# Patient Record
Sex: MALE | Race: ASIAN | NOT HISPANIC OR LATINO | URBAN - METROPOLITAN AREA
[De-identification: names, ages, dates, MRNs, and addresses within clinical notes are randomized per-mention and may not be internally consistent; named-entity substitution may affect disease eponyms.]

---

## 2022-01-02 ENCOUNTER — OFFICE VISIT (OUTPATIENT)
Dept: URGENT CARE | Facility: CLINIC | Age: 32
End: 2022-01-02

## 2022-01-02 DIAGNOSIS — R10.11 RUQ PAIN: ICD-10-CM

## 2022-01-02 PROCEDURE — 99204 OFFICE O/P NEW MOD 45 MIN: CPT | Performed by: NURSE PRACTITIONER

## 2022-01-03 VITALS — WEIGHT: 240 LBS | HEIGHT: 74 IN | BODY MASS INDEX: 30.8 KG/M2

## 2022-01-03 ASSESSMENT — CROHNS DISEASE ACTIVITY INDEX (CDAI): CDAI SCORE: 0

## 2022-01-03 ASSESSMENT — ENCOUNTER SYMPTOMS
FEVER: 0
SHORTNESS OF BREATH: 0
FLATUS: 0
HEADACHES: 0
SORE THROAT: 0
ANOREXIA: 0
BELCHING: 0
DIARRHEA: 0
NAUSEA: 1
ABDOMINAL PAIN: 1
VOMITING: 0
BLOOD IN STOOL: 0
EYE PAIN: 0
MYALGIAS: 0
CHILLS: 0
DIZZINESS: 0
CONSTIPATION: 0

## 2022-01-03 NOTE — PROGRESS NOTES
"Subjective:   Fernie Souza is a 31 y.o. male who presents for Abdominal Pain (epigastric, x1 day nausea)      Abdominal Pain  This is a new problem. The current episode started today. The onset quality is undetermined. The problem occurs constantly. The problem has been unchanged. The pain is located in the epigastric region. The pain is at a severity of 7/10. The pain is moderate. The quality of the pain is burning. The abdominal pain radiates to the back. Associated symptoms include nausea. Pertinent negatives include no anorexia, belching, constipation, diarrhea, fever, flatus, headaches, hematuria, melena, myalgias or vomiting. The pain is aggravated by movement and eating. The pain is relieved by nothing. He has tried antacids for the symptoms. The treatment provided no relief. There is no history of colon cancer, Crohn's disease, GERD, irritable bowel syndrome, pancreatitis or PUD.       Review of Systems   Constitutional: Negative for chills and fever.   HENT: Negative for sore throat.    Eyes: Negative for pain.   Respiratory: Negative for shortness of breath.    Cardiovascular: Negative for chest pain.   Gastrointestinal: Positive for abdominal pain and nausea. Negative for anorexia, blood in stool, constipation, diarrhea, flatus, melena and vomiting.   Genitourinary: Negative for hematuria.   Musculoskeletal: Negative for myalgias.   Skin: Negative for rash.   Neurological: Negative for dizziness and headaches.       Medications:    • This patient does not have an active medication from one of the medication groupers.    Allergies: Patient has no allergy information on record.    Problem List: Fernie Souza does not have a problem list on file.    Surgical History:  No past surgical history on file.    Past Social Hx: Fernie Souza       Past Family Hx:  Fernie Souza family history is not on file.     Problem list, medications, and allergies reviewed by myself today in Epic.     Objective:     Ht 1.88 m (6' 2\")   " Wt 109 kg (240 lb)   BMI 30.81 kg/m²     Physical Exam  Vitals and nursing note reviewed.   Constitutional:       General: He is not in acute distress.     Appearance: He is well-developed.   HENT:      Head: Normocephalic and atraumatic.      Right Ear: Tympanic membrane and external ear normal.      Left Ear: Tympanic membrane and external ear normal.      Nose: Nose normal.      Right Sinus: No maxillary sinus tenderness or frontal sinus tenderness.      Left Sinus: No maxillary sinus tenderness or frontal sinus tenderness.      Mouth/Throat:      Mouth: Mucous membranes are moist.      Pharynx: Uvula midline. No posterior oropharyngeal erythema.      Tonsils: No tonsillar exudate or tonsillar abscesses.   Eyes:      General:         Right eye: No discharge.         Left eye: No discharge.      Conjunctiva/sclera: Conjunctivae normal.   Cardiovascular:      Rate and Rhythm: Normal rate.   Pulmonary:      Effort: Pulmonary effort is normal. No respiratory distress.      Breath sounds: Normal breath sounds.   Abdominal:      General: Bowel sounds are normal. There is no distension.      Tenderness: There is abdominal tenderness in the right upper quadrant and epigastric area. There is no right CVA tenderness, left CVA tenderness or guarding. Positive signs include Valera's sign. Negative signs include McBurney's sign and obturator sign.   Musculoskeletal:         General: Normal range of motion.   Skin:     General: Skin is warm and dry.   Neurological:      General: No focal deficit present.      Mental Status: He is alert and oriented to person, place, and time. Mental status is at baseline.      Gait: Gait (gait at baseline) normal.   Psychiatric:         Judgment: Judgment normal.         Assessment/Plan:     Diagnosis and associated orders:     1. RUQ pain  Hyoscyamine Sulfate (HYOSCYAMINE-LIDOCAINE-MAALOX, GI COCKTAIL,)    US-RUQ    Comp Metabolic Panel    CBC WITH DIFFERENTIAL    LIPASE    AMYLASE     CANCELED: CBC WITH DIFFERENTIAL    CANCELED: Comp Metabolic Panel    CANCELED: AMYLASE    CANCELED: LIPASE      Comments/MDM:     I personally reviewed prior external notes and prior test results pertinent to today's visit.  Patient does have right upper quadrant tenderness will obtain diagnostic ultrasound and labs for further evaluation management differentials include but not limited to cholecystitis, pancreatitis, GERD, PUD.  GI cocktail provided in clinic having some relief in symptoms.  Will follow up results and treat as indicated.  Discussed management options, risks and benefits, and alternatives to treatment plan agreed upon.   Red flags discussed and indications to immediately call 911 or present to the Emergency Department.   Supportive care, differential diagnoses, and indications for immediate follow-up discussed with patient.    • Patient expresses understanding and agrees to plan. Patient denies any other questions or concerns.   •              Please note that this dictation was created using voice recognition software. I have made a reasonable attempt to correct obvious errors, but I expect that there are errors of grammar and possibly content that I did not discover before finalizing the note.    This note was electronically signed by Montrell ODELL

## 2024-08-15 ENCOUNTER — APPOINTMENT (OUTPATIENT)
Dept: RADIOLOGY | Facility: MEDICAL CENTER | Age: 34
End: 2024-08-15
Attending: EMERGENCY MEDICINE
Payer: COMMERCIAL

## 2024-08-15 ENCOUNTER — HOSPITAL ENCOUNTER (INPATIENT)
Facility: MEDICAL CENTER | Age: 34
LOS: 1 days | DRG: 064 | End: 2024-08-16
Attending: EMERGENCY MEDICINE | Admitting: STUDENT IN AN ORGANIZED HEALTH CARE EDUCATION/TRAINING PROGRAM
Payer: COMMERCIAL

## 2024-08-15 ENCOUNTER — HOSPITAL ENCOUNTER (EMERGENCY)
Facility: MEDICAL CENTER | Age: 34
End: 2024-08-15
Attending: EMERGENCY MEDICINE
Payer: COMMERCIAL

## 2024-08-15 VITALS
BODY MASS INDEX: 26.85 KG/M2 | DIASTOLIC BLOOD PRESSURE: 88 MMHG | HEART RATE: 66 BPM | RESPIRATION RATE: 16 BRPM | TEMPERATURE: 97.4 F | HEIGHT: 74 IN | OXYGEN SATURATION: 96 % | SYSTOLIC BLOOD PRESSURE: 183 MMHG | WEIGHT: 209.22 LBS

## 2024-08-15 DIAGNOSIS — R20.2 NUMBNESS AND TINGLING OF RIGHT ARM: ICD-10-CM

## 2024-08-15 DIAGNOSIS — R20.0 NUMBNESS AND TINGLING OF RIGHT ARM: ICD-10-CM

## 2024-08-15 DIAGNOSIS — G12.29 BULBAR WEAKNESS (HCC): ICD-10-CM

## 2024-08-15 DIAGNOSIS — R13.10 DYSPHAGIA, UNSPECIFIED TYPE: ICD-10-CM

## 2024-08-15 DIAGNOSIS — I63.9 CEREBROVASCULAR ACCIDENT (CVA), UNSPECIFIED MECHANISM (HCC): ICD-10-CM

## 2024-08-15 DIAGNOSIS — R13.19 ESOPHAGEAL DYSPHAGIA: ICD-10-CM

## 2024-08-15 LAB
ALBUMIN SERPL BCP-MCNC: 4.3 G/DL (ref 3.2–4.9)
ALBUMIN/GLOB SERPL: 1.5 G/DL
ALP SERPL-CCNC: 40 U/L (ref 30–99)
ALT SERPL-CCNC: 12 U/L (ref 2–50)
AMPHET UR QL SCN: NEGATIVE
ANION GAP SERPL CALC-SCNC: 14 MMOL/L (ref 7–16)
APTT PPP: 28.1 SEC (ref 24.7–36)
AST SERPL-CCNC: 16 U/L (ref 12–45)
BARBITURATES UR QL SCN: NEGATIVE
BASOPHILS # BLD AUTO: 0.5 % (ref 0–1.8)
BASOPHILS # BLD: 0.03 K/UL (ref 0–0.12)
BENZODIAZ UR QL SCN: NEGATIVE
BILIRUB SERPL-MCNC: 0.3 MG/DL (ref 0.1–1.5)
BUN SERPL-MCNC: 13 MG/DL (ref 8–22)
BZE UR QL SCN: NEGATIVE
CALCIUM ALBUM COR SERPL-MCNC: 8.9 MG/DL (ref 8.5–10.5)
CALCIUM SERPL-MCNC: 9.1 MG/DL (ref 8.4–10.2)
CANNABINOIDS UR QL SCN: NEGATIVE
CHLORIDE SERPL-SCNC: 106 MMOL/L (ref 96–112)
CO2 SERPL-SCNC: 20 MMOL/L (ref 20–33)
CREAT SERPL-MCNC: 0.8 MG/DL (ref 0.5–1.4)
EKG IMPRESSION: NORMAL
EOSINOPHIL # BLD AUTO: 0.03 K/UL (ref 0–0.51)
EOSINOPHIL NFR BLD: 0.5 % (ref 0–6.9)
ERYTHROCYTE [DISTWIDTH] IN BLOOD BY AUTOMATED COUNT: 43 FL (ref 35.9–50)
FENTANYL UR QL: NEGATIVE
GFR SERPLBLD CREATININE-BSD FMLA CKD-EPI: 118 ML/MIN/1.73 M 2
GLOBULIN SER CALC-MCNC: 2.8 G/DL (ref 1.9–3.5)
GLUCOSE SERPL-MCNC: 107 MG/DL (ref 65–99)
HCT VFR BLD AUTO: 47.4 % (ref 42–52)
HGB BLD-MCNC: 16.2 G/DL (ref 14–18)
IMM GRANULOCYTES # BLD AUTO: 0.01 K/UL (ref 0–0.11)
IMM GRANULOCYTES NFR BLD AUTO: 0.2 % (ref 0–0.9)
INR PPP: 0.95 (ref 0.87–1.13)
LYMPHOCYTES # BLD AUTO: 0.78 K/UL (ref 1–4.8)
LYMPHOCYTES NFR BLD: 12.6 % (ref 22–41)
MCH RBC QN AUTO: 31.5 PG (ref 27–33)
MCHC RBC AUTO-ENTMCNC: 34.2 G/DL (ref 32.3–36.5)
MCV RBC AUTO: 92.2 FL (ref 81.4–97.8)
METHADONE UR QL SCN: NEGATIVE
MONOCYTES # BLD AUTO: 0.39 K/UL (ref 0–0.85)
MONOCYTES NFR BLD AUTO: 6.3 % (ref 0–13.4)
NEUTROPHILS # BLD AUTO: 4.97 K/UL (ref 1.82–7.42)
NEUTROPHILS NFR BLD: 79.9 % (ref 44–72)
NRBC # BLD AUTO: 0 K/UL
NRBC BLD-RTO: 0 /100 WBC (ref 0–0.2)
OPIATES UR QL SCN: NEGATIVE
OXYCODONE UR QL SCN: NEGATIVE
PCP UR QL SCN: NEGATIVE
PLATELET # BLD AUTO: 218 K/UL (ref 164–446)
PMV BLD AUTO: 10.1 FL (ref 9–12.9)
POTASSIUM SERPL-SCNC: 4 MMOL/L (ref 3.6–5.5)
PROPOXYPH UR QL SCN: NEGATIVE
PROT SERPL-MCNC: 7.1 G/DL (ref 6–8.2)
PROTHROMBIN TIME: 13.2 SEC (ref 12–14.6)
RBC # BLD AUTO: 5.14 M/UL (ref 4.7–6.1)
SODIUM SERPL-SCNC: 140 MMOL/L (ref 135–145)
T4 FREE SERPL-MCNC: 1.39 NG/DL (ref 0.93–1.7)
TROPONIN T SERPL-MCNC: <6 NG/L (ref 6–19)
TSH SERPL DL<=0.005 MIU/L-ACNC: 1.02 UIU/ML (ref 0.38–5.33)
WBC # BLD AUTO: 6.2 K/UL (ref 4.8–10.8)

## 2024-08-15 PROCEDURE — 99285 EMERGENCY DEPT VISIT HI MDM: CPT

## 2024-08-15 PROCEDURE — 80053 COMPREHEN METABOLIC PANEL: CPT

## 2024-08-15 PROCEDURE — 71045 X-RAY EXAM CHEST 1 VIEW: CPT

## 2024-08-15 PROCEDURE — 99223 1ST HOSP IP/OBS HIGH 75: CPT | Mod: GC | Performed by: STUDENT IN AN ORGANIZED HEALTH CARE EDUCATION/TRAINING PROGRAM

## 2024-08-15 PROCEDURE — 83735 ASSAY OF MAGNESIUM: CPT

## 2024-08-15 PROCEDURE — 80307 DRUG TEST PRSMV CHEM ANLYZR: CPT

## 2024-08-15 PROCEDURE — 84443 ASSAY THYROID STIM HORMONE: CPT

## 2024-08-15 PROCEDURE — 84439 ASSAY OF FREE THYROXINE: CPT

## 2024-08-15 PROCEDURE — 84484 ASSAY OF TROPONIN QUANT: CPT

## 2024-08-15 PROCEDURE — 85730 THROMBOPLASTIN TIME PARTIAL: CPT

## 2024-08-15 PROCEDURE — 700105 HCHG RX REV CODE 258: Performed by: EMERGENCY MEDICINE

## 2024-08-15 PROCEDURE — 94760 N-INVAS EAR/PLS OXIMETRY 1: CPT

## 2024-08-15 PROCEDURE — 85025 COMPLETE CBC W/AUTO DIFF WBC: CPT

## 2024-08-15 PROCEDURE — 83036 HEMOGLOBIN GLYCOSYLATED A1C: CPT

## 2024-08-15 PROCEDURE — 99406 BEHAV CHNG SMOKING 3-10 MIN: CPT

## 2024-08-15 PROCEDURE — 770020 HCHG ROOM/CARE - TELE (206)

## 2024-08-15 PROCEDURE — 93005 ELECTROCARDIOGRAM TRACING: CPT | Performed by: EMERGENCY MEDICINE

## 2024-08-15 PROCEDURE — 85025 COMPLETE CBC W/AUTO DIFF WBC: CPT | Mod: 91

## 2024-08-15 PROCEDURE — 700117 HCHG RX CONTRAST REV CODE 255: Performed by: EMERGENCY MEDICINE

## 2024-08-15 PROCEDURE — 80053 COMPREHEN METABOLIC PANEL: CPT | Mod: 91

## 2024-08-15 PROCEDURE — 36415 COLL VENOUS BLD VENIPUNCTURE: CPT

## 2024-08-15 PROCEDURE — 85610 PROTHROMBIN TIME: CPT

## 2024-08-15 PROCEDURE — 70496 CT ANGIOGRAPHY HEAD: CPT

## 2024-08-15 PROCEDURE — 0042T CT-CEREBRAL PERFUSION ANALYSIS: CPT

## 2024-08-15 PROCEDURE — 93005 ELECTROCARDIOGRAM TRACING: CPT

## 2024-08-15 PROCEDURE — 70498 CT ANGIOGRAPHY NECK: CPT

## 2024-08-15 RX ORDER — PHENOL 1.4 %
30 AEROSOL, SPRAY (ML) MUCOUS MEMBRANE
Status: ON HOLD | COMMUNITY
End: 2024-08-16

## 2024-08-15 RX ORDER — PROMETHAZINE HYDROCHLORIDE 25 MG/1
12.5-25 SUPPOSITORY RECTAL EVERY 4 HOURS PRN
Status: DISCONTINUED | OUTPATIENT
Start: 2024-08-15 | End: 2024-08-16

## 2024-08-15 RX ORDER — NICOTINE 21 MG/24HR
21 PATCH, TRANSDERMAL 24 HOURS TRANSDERMAL
Status: DISCONTINUED | OUTPATIENT
Start: 2024-08-16 | End: 2024-08-16 | Stop reason: HOSPADM

## 2024-08-15 RX ORDER — ONDANSETRON 4 MG/1
4 TABLET, ORALLY DISINTEGRATING ORAL EVERY 4 HOURS PRN
Status: DISCONTINUED | OUTPATIENT
Start: 2024-08-15 | End: 2024-08-16 | Stop reason: HOSPADM

## 2024-08-15 RX ORDER — ENOXAPARIN SODIUM 100 MG/ML
40 INJECTION SUBCUTANEOUS DAILY
Status: DISCONTINUED | OUTPATIENT
Start: 2024-08-16 | End: 2024-08-16

## 2024-08-15 RX ORDER — POLYETHYLENE GLYCOL 3350 17 G/17G
1 POWDER, FOR SOLUTION ORAL
Status: DISCONTINUED | OUTPATIENT
Start: 2024-08-15 | End: 2024-08-16 | Stop reason: HOSPADM

## 2024-08-15 RX ORDER — ONDANSETRON 2 MG/ML
4 INJECTION INTRAMUSCULAR; INTRAVENOUS EVERY 4 HOURS PRN
Status: DISCONTINUED | OUTPATIENT
Start: 2024-08-15 | End: 2024-08-16 | Stop reason: HOSPADM

## 2024-08-15 RX ORDER — ACETAMINOPHEN 325 MG/1
650 TABLET ORAL EVERY 6 HOURS PRN
Status: DISCONTINUED | OUTPATIENT
Start: 2024-08-15 | End: 2024-08-16 | Stop reason: HOSPADM

## 2024-08-15 RX ORDER — PROCHLORPERAZINE EDISYLATE 5 MG/ML
5-10 INJECTION INTRAMUSCULAR; INTRAVENOUS EVERY 4 HOURS PRN
Status: DISCONTINUED | OUTPATIENT
Start: 2024-08-15 | End: 2024-08-16 | Stop reason: HOSPADM

## 2024-08-15 RX ORDER — AMOXICILLIN 250 MG
2 CAPSULE ORAL EVERY EVENING
Status: DISCONTINUED | OUTPATIENT
Start: 2024-08-16 | End: 2024-08-16 | Stop reason: HOSPADM

## 2024-08-15 RX ORDER — SODIUM CHLORIDE 9 MG/ML
INJECTION, SOLUTION INTRAVENOUS CONTINUOUS
Status: DISCONTINUED | OUTPATIENT
Start: 2024-08-15 | End: 2024-08-15 | Stop reason: HOSPADM

## 2024-08-15 RX ORDER — PROMETHAZINE HYDROCHLORIDE 25 MG/1
12.5-25 TABLET ORAL EVERY 4 HOURS PRN
Status: DISCONTINUED | OUTPATIENT
Start: 2024-08-15 | End: 2024-08-16

## 2024-08-15 RX ADMIN — IOHEXOL 100 ML: 350 INJECTION, SOLUTION INTRAVENOUS at 20:03

## 2024-08-15 RX ADMIN — SODIUM CHLORIDE: 9 INJECTION, SOLUTION INTRAVENOUS at 20:31

## 2024-08-15 ASSESSMENT — FIBROSIS 4 INDEX: FIB4 SCORE: 0.74

## 2024-08-16 ENCOUNTER — APPOINTMENT (OUTPATIENT)
Dept: RADIOLOGY | Facility: MEDICAL CENTER | Age: 34
DRG: 064 | End: 2024-08-16
Payer: COMMERCIAL

## 2024-08-16 ENCOUNTER — PHARMACY VISIT (OUTPATIENT)
Dept: PHARMACY | Facility: MEDICAL CENTER | Age: 34
End: 2024-08-16
Payer: COMMERCIAL

## 2024-08-16 VITALS
SYSTOLIC BLOOD PRESSURE: 134 MMHG | WEIGHT: 209 LBS | BODY MASS INDEX: 26.82 KG/M2 | TEMPERATURE: 97.5 F | HEIGHT: 74 IN | OXYGEN SATURATION: 96 % | RESPIRATION RATE: 16 BRPM | HEART RATE: 61 BPM | DIASTOLIC BLOOD PRESSURE: 75 MMHG

## 2024-08-16 PROBLEM — F10.90 ALCOHOL USE DISORDER: Status: ACTIVE | Noted: 2024-08-16

## 2024-08-16 PROBLEM — F17.200 TOBACCO USE DISORDER: Status: ACTIVE | Noted: 2024-08-16

## 2024-08-16 LAB
ALBUMIN SERPL BCP-MCNC: 4.1 G/DL (ref 3.2–4.9)
ALBUMIN SERPL BCP-MCNC: 4.3 G/DL (ref 3.2–4.9)
ALBUMIN/GLOB SERPL: 1.7 G/DL
ALBUMIN/GLOB SERPL: 1.8 G/DL
ALP SERPL-CCNC: 38 U/L (ref 30–99)
ALP SERPL-CCNC: 39 U/L (ref 30–99)
ALT SERPL-CCNC: 9 U/L (ref 2–50)
ALT SERPL-CCNC: 9 U/L (ref 2–50)
ANION GAP SERPL CALC-SCNC: 10 MMOL/L (ref 7–16)
ANION GAP SERPL CALC-SCNC: 15 MMOL/L (ref 7–16)
AST SERPL-CCNC: 11 U/L (ref 12–45)
AST SERPL-CCNC: 13 U/L (ref 12–45)
BASOPHILS # BLD AUTO: 0.3 % (ref 0–1.8)
BASOPHILS # BLD: 0.02 K/UL (ref 0–0.12)
BILIRUB SERPL-MCNC: 0.4 MG/DL (ref 0.1–1.5)
BILIRUB SERPL-MCNC: 0.5 MG/DL (ref 0.1–1.5)
BUN SERPL-MCNC: 11 MG/DL (ref 8–22)
BUN SERPL-MCNC: 11 MG/DL (ref 8–22)
CALCIUM ALBUM COR SERPL-MCNC: 8.9 MG/DL (ref 8.5–10.5)
CALCIUM ALBUM COR SERPL-MCNC: 9.1 MG/DL (ref 8.5–10.5)
CALCIUM SERPL-MCNC: 9.1 MG/DL (ref 8.5–10.5)
CALCIUM SERPL-MCNC: 9.2 MG/DL (ref 8.5–10.5)
CHLORIDE SERPL-SCNC: 105 MMOL/L (ref 96–112)
CHLORIDE SERPL-SCNC: 107 MMOL/L (ref 96–112)
CO2 SERPL-SCNC: 19 MMOL/L (ref 20–33)
CO2 SERPL-SCNC: 25 MMOL/L (ref 20–33)
CREAT SERPL-MCNC: 0.71 MG/DL (ref 0.5–1.4)
CREAT SERPL-MCNC: 0.92 MG/DL (ref 0.5–1.4)
EOSINOPHIL # BLD AUTO: 0.06 K/UL (ref 0–0.51)
EOSINOPHIL NFR BLD: 0.9 % (ref 0–6.9)
ERYTHROCYTE [DISTWIDTH] IN BLOOD BY AUTOMATED COUNT: 44.2 FL (ref 35.9–50)
EST. AVERAGE GLUCOSE BLD GHB EST-MCNC: 120 MG/DL
GFR SERPLBLD CREATININE-BSD FMLA CKD-EPI: 111 ML/MIN/1.73 M 2
GFR SERPLBLD CREATININE-BSD FMLA CKD-EPI: 122 ML/MIN/1.73 M 2
GLOBULIN SER CALC-MCNC: 2.3 G/DL (ref 1.9–3.5)
GLOBULIN SER CALC-MCNC: 2.5 G/DL (ref 1.9–3.5)
GLUCOSE SERPL-MCNC: 128 MG/DL (ref 65–99)
GLUCOSE SERPL-MCNC: 94 MG/DL (ref 65–99)
HBA1C MFR BLD: 5.8 % (ref 4–5.6)
HCT VFR BLD AUTO: 46 % (ref 42–52)
HGB BLD-MCNC: 16 G/DL (ref 14–18)
IMM GRANULOCYTES # BLD AUTO: 0.01 K/UL (ref 0–0.11)
IMM GRANULOCYTES NFR BLD AUTO: 0.2 % (ref 0–0.9)
LYMPHOCYTES # BLD AUTO: 1.33 K/UL (ref 1–4.8)
LYMPHOCYTES NFR BLD: 20.6 % (ref 22–41)
MAGNESIUM SERPL-MCNC: 2.1 MG/DL (ref 1.5–2.5)
MAGNESIUM SERPL-MCNC: 2.3 MG/DL (ref 1.5–2.5)
MCH RBC QN AUTO: 31.8 PG (ref 27–33)
MCHC RBC AUTO-ENTMCNC: 34.8 G/DL (ref 32.3–36.5)
MCV RBC AUTO: 91.5 FL (ref 81.4–97.8)
MONOCYTES # BLD AUTO: 0.42 K/UL (ref 0–0.85)
MONOCYTES NFR BLD AUTO: 6.5 % (ref 0–13.4)
NEUTROPHILS # BLD AUTO: 4.62 K/UL (ref 1.82–7.42)
NEUTROPHILS NFR BLD: 71.5 % (ref 44–72)
NRBC # BLD AUTO: 0 K/UL
NRBC BLD-RTO: 0 /100 WBC (ref 0–0.2)
PLATELET # BLD AUTO: 232 K/UL (ref 164–446)
PMV BLD AUTO: 10.1 FL (ref 9–12.9)
POTASSIUM SERPL-SCNC: 3.7 MMOL/L (ref 3.6–5.5)
POTASSIUM SERPL-SCNC: 4.1 MMOL/L (ref 3.6–5.5)
PROT SERPL-MCNC: 6.4 G/DL (ref 6–8.2)
PROT SERPL-MCNC: 6.8 G/DL (ref 6–8.2)
RBC # BLD AUTO: 5.03 M/UL (ref 4.7–6.1)
SODIUM SERPL-SCNC: 139 MMOL/L (ref 135–145)
SODIUM SERPL-SCNC: 142 MMOL/L (ref 135–145)
WBC # BLD AUTO: 6.5 K/UL (ref 4.8–10.8)

## 2024-08-16 PROCEDURE — 70553 MRI BRAIN STEM W/O & W/DYE: CPT

## 2024-08-16 PROCEDURE — 700117 HCHG RX CONTRAST REV CODE 255: Mod: JZ

## 2024-08-16 PROCEDURE — 92610 EVALUATE SWALLOWING FUNCTION: CPT

## 2024-08-16 PROCEDURE — 83735 ASSAY OF MAGNESIUM: CPT

## 2024-08-16 PROCEDURE — 700102 HCHG RX REV CODE 250 W/ 637 OVERRIDE(OP): Performed by: STUDENT IN AN ORGANIZED HEALTH CARE EDUCATION/TRAINING PROGRAM

## 2024-08-16 PROCEDURE — 92611 MOTION FLUOROSCOPY/SWALLOW: CPT

## 2024-08-16 PROCEDURE — 97165 OT EVAL LOW COMPLEX 30 MIN: CPT

## 2024-08-16 PROCEDURE — 99239 HOSP IP/OBS DSCHRG MGMT >30: CPT | Performed by: STUDENT IN AN ORGANIZED HEALTH CARE EDUCATION/TRAINING PROGRAM

## 2024-08-16 PROCEDURE — A9270 NON-COVERED ITEM OR SERVICE: HCPCS | Performed by: STUDENT IN AN ORGANIZED HEALTH CARE EDUCATION/TRAINING PROGRAM

## 2024-08-16 PROCEDURE — 97162 PT EVAL MOD COMPLEX 30 MIN: CPT

## 2024-08-16 PROCEDURE — 36415 COLL VENOUS BLD VENIPUNCTURE: CPT

## 2024-08-16 PROCEDURE — 80053 COMPREHEN METABOLIC PANEL: CPT

## 2024-08-16 PROCEDURE — RXMED WILLOW AMBULATORY MEDICATION CHARGE: Performed by: STUDENT IN AN ORGANIZED HEALTH CARE EDUCATION/TRAINING PROGRAM

## 2024-08-16 PROCEDURE — 99222 1ST HOSP IP/OBS MODERATE 55: CPT

## 2024-08-16 PROCEDURE — A9579 GAD-BASE MR CONTRAST NOS,1ML: HCPCS | Mod: JZ

## 2024-08-16 PROCEDURE — 74230 X-RAY XM SWLNG FUNCJ C+: CPT

## 2024-08-16 RX ORDER — GAUZE BANDAGE 2" X 2"
100 BANDAGE TOPICAL DAILY
Status: DISCONTINUED | OUTPATIENT
Start: 2024-08-16 | End: 2024-08-16

## 2024-08-16 RX ORDER — ASPIRIN 300 MG/1
300 SUPPOSITORY RECTAL DAILY
Status: DISCONTINUED | OUTPATIENT
Start: 2024-08-16 | End: 2024-08-16

## 2024-08-16 RX ORDER — HYDRALAZINE HYDROCHLORIDE 20 MG/ML
10 INJECTION INTRAMUSCULAR; INTRAVENOUS EVERY 6 HOURS PRN
Status: DISCONTINUED | OUTPATIENT
Start: 2024-08-16 | End: 2024-08-16

## 2024-08-16 RX ORDER — ASPIRIN 81 MG/1
81 TABLET, CHEWABLE ORAL DAILY
Status: DISCONTINUED | OUTPATIENT
Start: 2024-08-16 | End: 2024-08-16

## 2024-08-16 RX ORDER — FOLIC ACID 1 MG/1
1 TABLET ORAL DAILY
Status: DISCONTINUED | OUTPATIENT
Start: 2024-08-16 | End: 2024-08-16

## 2024-08-16 RX ADMIN — APIXABAN 5 MG: 5 TABLET, FILM COATED ORAL at 14:53

## 2024-08-16 RX ADMIN — GADOTERIDOL 19 ML: 279.3 INJECTION, SOLUTION INTRAVENOUS at 01:47

## 2024-08-16 SDOH — ECONOMIC STABILITY: TRANSPORTATION INSECURITY
IN THE PAST 12 MONTHS, HAS LACK OF RELIABLE TRANSPORTATION KEPT YOU FROM MEDICAL APPOINTMENTS, MEETINGS, WORK OR FROM GETTING THINGS NEEDED FOR DAILY LIVING?: NO

## 2024-08-16 SDOH — ECONOMIC STABILITY: TRANSPORTATION INSECURITY
IN THE PAST 12 MONTHS, HAS THE LACK OF TRANSPORTATION KEPT YOU FROM MEDICAL APPOINTMENTS OR FROM GETTING MEDICATIONS?: NO

## 2024-08-16 ASSESSMENT — ENCOUNTER SYMPTOMS
FEVER: 0
HEARTBURN: 0
CONSTITUTIONAL NEGATIVE: 1
HEMOPTYSIS: 0
COUGH: 0
CARDIOVASCULAR NEGATIVE: 1
HALLUCINATIONS: 0
CHILLS: 0
NAUSEA: 0
PSYCHIATRIC NEGATIVE: 1
PALPITATIONS: 0
SPUTUM PRODUCTION: 0
ORTHOPNEA: 0
DEPRESSION: 0
ABDOMINAL PAIN: 0
HEADACHES: 0
MUSCULOSKELETAL NEGATIVE: 1
RESPIRATORY NEGATIVE: 1
DIZZINESS: 0
MYALGIAS: 0
SENSORY CHANGE: 1
TINGLING: 0
WEIGHT LOSS: 0
EYES NEGATIVE: 1

## 2024-08-16 ASSESSMENT — LIFESTYLE VARIABLES
DOES PATIENT WANT TO TALK TO SOMEONE ABOUT QUITTING: YES
TOTAL SCORE: 4
CONSUMPTION TOTAL: POSITIVE
DOES PATIENT WANT TO STOP DRINKING: YES
TOTAL SCORE: 4
TOTAL SCORE: 4
ALCOHOL_USE: YES
HAVE PEOPLE ANNOYED YOU BY CRITICIZING YOUR DRINKING: YES
ON A TYPICAL DAY WHEN YOU DRINK ALCOHOL HOW MANY DRINKS DO YOU HAVE: 0
HOW MANY TIMES IN THE PAST YEAR HAVE YOU HAD 5 OR MORE DRINKS IN A DAY: 10
AVERAGE NUMBER OF DAYS PER WEEK YOU HAVE A DRINK CONTAINING ALCOHOL: 0
EVER HAD A DRINK FIRST THING IN THE MORNING TO STEADY YOUR NERVES TO GET RID OF A HANGOVER: YES
EVER FELT BAD OR GUILTY ABOUT YOUR DRINKING: YES
HAVE YOU EVER FELT YOU SHOULD CUT DOWN ON YOUR DRINKING: YES

## 2024-08-16 ASSESSMENT — COGNITIVE AND FUNCTIONAL STATUS - GENERAL
SUGGESTED CMS G CODE MODIFIER MOBILITY: CH
MOBILITY SCORE: 24
DAILY ACTIVITIY SCORE: 24
SUGGESTED CMS G CODE MODIFIER DAILY ACTIVITY: CH

## 2024-08-16 ASSESSMENT — SOCIAL DETERMINANTS OF HEALTH (SDOH)
WITHIN THE PAST 12 MONTHS, YOU WORRIED THAT YOUR FOOD WOULD RUN OUT BEFORE YOU GOT THE MONEY TO BUY MORE: NEVER TRUE
IN THE PAST 12 MONTHS, HAS THE ELECTRIC, GAS, OIL, OR WATER COMPANY THREATENED TO SHUT OFF SERVICE IN YOUR HOME?: NO
WITHIN THE LAST YEAR, HAVE YOU BEEN AFRAID OF YOUR PARTNER OR EX-PARTNER?: NO
WITHIN THE LAST YEAR, HAVE YOU BEEN KICKED, HIT, SLAPPED, OR OTHERWISE PHYSICALLY HURT BY YOUR PARTNER OR EX-PARTNER?: NO
WITHIN THE LAST YEAR, HAVE YOU BEEN HUMILIATED OR EMOTIONALLY ABUSED IN OTHER WAYS BY YOUR PARTNER OR EX-PARTNER?: NO
WITHIN THE PAST 12 MONTHS, THE FOOD YOU BOUGHT JUST DIDN'T LAST AND YOU DIDN'T HAVE MONEY TO GET MORE: NEVER TRUE
WITHIN THE LAST YEAR, HAVE TO BEEN RAPED OR FORCED TO HAVE ANY KIND OF SEXUAL ACTIVITY BY YOUR PARTNER OR EX-PARTNER?: NO

## 2024-08-16 ASSESSMENT — GAIT ASSESSMENTS
GAIT LEVEL OF ASSIST: SUPERVISED
DISTANCE (FEET): 350

## 2024-08-16 ASSESSMENT — PAIN DESCRIPTION - PAIN TYPE
TYPE: ACUTE PAIN
TYPE: ACUTE PAIN

## 2024-08-16 ASSESSMENT — PATIENT HEALTH QUESTIONNAIRE - PHQ9
1. LITTLE INTEREST OR PLEASURE IN DOING THINGS: NOT AT ALL
2. FEELING DOWN, DEPRESSED, IRRITABLE, OR HOPELESS: NOT AT ALL
SUM OF ALL RESPONSES TO PHQ9 QUESTIONS 1 AND 2: 0

## 2024-08-16 ASSESSMENT — ACTIVITIES OF DAILY LIVING (ADL): TOILETING: INDEPENDENT

## 2024-08-16 NOTE — PROGRESS NOTES
4 Eyes Skin Assessment Completed by TARA Gallegos and TARA Contreras.    Head WDL  Ears WDL  Nose WDL  Mouth WDL  Neck WDL  Breast/Chest WDL  Shoulder Blades WDL  Spine WDL  (R) Arm/Elbow/Hand WDL  (L) Arm/Elbow/Hand WDL  Abdomen WDL  Groin WDL  Scrotum/Coccyx/Buttocks Redness and Blanching  (R) Leg WDL  (L) Leg WDL  (R) Heel/Foot/Toe Blanching  (L) Heel/Foot/Toe Blanching          Devices In Places Tele Box      Interventions In Place Pillows    Possible Skin Injury No    Pictures Uploaded Into Epic N/A  Wound Consult Placed N/A  RN Wound Prevention Protocol Ordered No

## 2024-08-16 NOTE — PROGRESS NOTES
Discharge instructions reviewed and signed by patient. No further questions at this time. IV DC'd by RN. Medications delivered to discharge lounge. Patient left with all belongings by car with friend/family member in stable condition.

## 2024-08-16 NOTE — ED TRIAGE NOTES
Chief Complaint   Patient presents with    Sent by MD     Transfer from [facility]. Transferred for further evaluation of RUE tingling and difficulty swallowing.     Aox4, GCS 15, ambulatory on arrival    Pt reports sudden onset of tingling to RUE when he was in the shower. Tinglings accompanied with some lightheadedness and difficulty swallowing salvia that started at approx 1200. Pt speaking in full sentences, VSS on room air.-n/v, -stroke scale    Pt transferred for further evaluation with neuro and MRI.     Hx: no pertinent medical hx    No interventions by EMS pta. Pt received approx 200cc NS from  pta.

## 2024-08-16 NOTE — CARE PLAN
The patient is Stable - Low risk of patient condition declining or worsening    Shift Goals  Clinical Goals: MRI, SLP eval, Monitor neuro status  Patient Goals: Get MRI done tonight  Family Goals: SUAD    Progress made toward(s) clinical / shift goals:  Patient is A&Ox4. Educated patient on POC. NIHSS score of 1. MRI completed. ECHO and SLP eval pending. Patient is Strict NPO. Patient denies pain at this time. Patient states no decreased sensation with touch, only to temperature. Bed is low and locked. Bed alarm on. Call light within reach. Hourly rounding continues.       Problem: Neuro Status  Goal: Neuro status will remain stable or improve  Outcome: Progressing     Problem: Respiratory - Stroke Patient  Goal: Patient will achieve/maintain optimum respiratory rate/effort  Outcome: Progressing     Problem: Self Care  Goal: Patient will have the ability to perform ADLs independently or with assistance (bathe, groom, dress, toilet and feed)  Outcome: Progressing       Patient is not progressing towards the following goals:

## 2024-08-16 NOTE — PROGRESS NOTES
Stroke education discussed with pt, went over BEFAST and s/s stroke. Called and LVM with stroke bridge clinic for appointment. Discussed Eliquis, all questions answered at this time. DCL orders in.

## 2024-08-16 NOTE — ED NOTES
Pt resting in room. Vs stable. Cardiac monitor on NSR noted. Call light in place. Will continue to monitor.

## 2024-08-16 NOTE — THERAPY
Speech Language Pathology   Clinical Swallow Evaluation     Patient Name: Fernie Souza  AGE:  34 y.o., SEX:  male  Medical Record #: [id number]  Date of Service: 8/16/2024      History of Present Illness    35 y/o admitted on 8/15 for RUE tingling and difficulty swallowing.    MRI of brain 8/16:  1.  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 The recent CT angiogram demonstrated occluded left vertebral artery. There is patent right vertebral and   basilar arteries.  2.  There is tiny area of acute infarct in the left inferior cerebellar peduncle.  3.  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    CTA of head 8/15:  1.  Distal left vertebral artery occlusion.    CTA of neck 8/15:  1.  Area of nonopacification of the distal left vertebral artery, compatible with occlusion.  2.  Severely hypoplastic left vertebral artery.  3.  Mediastinal and bilateral hilar adenopathy, workup and evaluation for causes of adenopathy recommended as clinically appropriate.    Dx chest 8/15:  No acute cardiopulmonary abnormality.      General Information:  Vitals  O2 (LPM): 0  O2 Delivery Device: None - Room Air  Level of Consciousness: Alert  Patient Behaviors: Anxious  Follows Directives: Yes      Prior Living Situation & Level of Function:  Housing / Facility: 3 Story Apartment / Condo  Lives with - Patient's Self Care Capacity: Alone and Able to Care For Self  Communication: WFL  Swallowing: WFL       Oral Mechanism Evaluation:  Dentition: Good   Facial Symmetry: Equal  Facial Sensation: Equal (equal feeling but impaired temperature perception)     Labial Observations: WFL   Lingual Observations: Midline  Motor Speech: WFL            Laryngeal Function:  Secretion Management: Expectoration of secretions  Voice Quality: WFL          Subjective  Pt endorsed increased difficulty  "swallowing over last 24 hours including management of secretions. He said he can swallow if he lifts his chin up otherwise it feels more effortful than usual. Pt endorsed changes in temperature sensation in cheek, hand, and quad but no change in tongue. He endorsed increased social stress over last few days/weeks, but denied depression/harmful thoughts to self.      Assessment  Current Method of Nutrition: NPO until cleared by speech pathology  Positioning: Nelson's (60-90 degrees)  Bolus Administration: Patient  O2 (LPM): 0 O2 Delivery Device: None - Room Air  Factor(s) Affecting Performance: None        Swallowing Trials:  Swallowing Trials  Thin Liquid (TN0): Impaired  Pureed (PU4): Impaired      Comments: No overt s/sx of aspiration noted with trials of thin liquids and small bite of puree. Pt c/o globus sensation with pudding and completed multiple effortful swallows. For cup sips of thin liquids, pt endorsed need to lift head up in order to swallow successful and upon palpation, noted to trigger 2 swallows. Given report of swallowing not at baseline, further PO trials discontinued.       Clinical Impressions  Pt is presenting with concerns for an acute change in swallow function s/p CVA. Recommend NPO pending MBSS to further assess swallow function. Will tentatively plan for 1100. Okay for small amount of ice chips to minimize xerostomia and maintain integrity of the swallow.       Recommendations  Diet Consistency: NPO pending MBSS  Instrumentation: VFSS (MBSS)  Medication: Non Oral  Oral Care: BID         SLP Treatment Plan  Treatment Plan: Dysphagia Treatment  SLP Frequency: 4x Per Week  Estimated Duration: Until Therapy Goals Met      Anticipated Discharge Needs  Discharge Recommendations: Other (depends on results of MBSS)            Patient / Family Goals  Patient / Family Goal #1: \"to figure out what's going on\"  Short Term Goals  Short Term Goal # 1: Pt will participate in an MBSS to further assess " swallow function and determine safest PO diet      Zaira Lezama, SLP

## 2024-08-16 NOTE — ED TRIAGE NOTES
"Chief Complaint   Patient presents with    Numbness     Started at approx 1200 today, reports noting altered sensation in R side of body- hand, arm, leg. Reports \"the temperature feels different\", but states sensation is otherwise the same. No unilateral deficit is appreciated. GCS is 15. Denies h/a, vision changes, unilateral weakness.     Dizziness     Since today. Intermittent.    Sore Throat     Reports feeling that throat was swollen just PTA. Speaks in complete sentences, resp e/u. Denies noted rash, cold sx.     Physical Exam  Pulmonary:      Effort: Pulmonary effort is normal.   Skin:     General: Skin is warm and dry.   Neurological:      Mental Status: He is alert.           "

## 2024-08-16 NOTE — DISCHARGE PLANNING
[facility] Transitional Care Coordination     Referral from: Dr. Reyes Yparraguirre  Insurance Provider on Facesheet: [employer]  Potential Rehab Diagnosis: TBD     Chart review indicates patient may have on going medical management and may have therapy needs to possibly meet inpatient rehab facility criteria with the goal of returning to community.     D/C support: friend     Physiatry consultation pended per protocol.   W/U and TX pending  Would appreciate therapy evaluations once clinically appropriate.     Thank you for the referral.

## 2024-08-16 NOTE — PROGRESS NOTES
Monitor summary: SB 50-52, IL -0.15, QRS -0.08, QT -0.20, per strip from the monitor room.

## 2024-08-16 NOTE — H&P
[facility] Internal Medicine History & Physical Note    Date of Service  8/16/2024    [facility] Team: LEILA   Attending: Rachana Zhao M.d.  Senior Resident: Armando R. Reyes Yparraguirre, M.D.  Contact Number: [phone]    Primary Care Physician  Pcp Pt States None    Consultants  Neurology: Dr. Hoyos    Code Status  Full Code    Chief Complaint  Chief Complaint   Patient presents with    Sent by MD     Transfer from [facility]. Transferred for further evaluation of RUE tingling and difficulty swallowing.        History of Presenting Illness (HPI): Fernie Souza is a 34 y.o. male transferred from [facility] for neurologic evaluation, who originally presented 8/15/2024 with dysphagia and paresthesia in right upper limb. No chronic conditions, does not take any medication.    Morning of admission, noticed difficulty passing food while attempting to have breakfast.  He was able to drink juice.  Also, noticed difficulty to perceive cold and right upper limb, no change in strength, no other signs of focalization.  No headaches, no palpitations, no shortness of breath, no chest discomfort.  As the day progressed, dysphagia worsened.  Unable to eat lunch.  Paresthesias remained the same.  Went to [facility] for evaluation.  No significant change in symptoms.     In this facility:  - Mildly hypertensive, rest of vitals within reference levels  - On arrival, dysphagia and improving paresthesias in right upper limb  - Unimpressive CBC/CMP  - Negative UDS, negative fentanyl  - Negative TSH/T4, troponin and coagulation panel  - CT cerebral perfusion shows cerebral blood flow less than 30% possibly representing completed infarct   - CTA head shows distal left vertebral artery occlusion  - CTA neck shows area of nonopacification of distal left vertebral artery, compatible with occlusion  - Benign chest x-ray  - Benign EKG    Transfer was initiated for neurologic evaluation.    I discussed the plan of care with patient and  bedside RN.    Review of Systems  Review of Systems   Constitutional: Negative.  Negative for chills, fever and weight loss.   HENT: Negative.     Eyes: Negative.    Respiratory: Negative.  Negative for cough, hemoptysis and sputum production.    Cardiovascular: Negative.  Negative for chest pain, palpitations and orthopnea.   Gastrointestinal:  Negative for abdominal pain, heartburn and nausea.        Inability to pass food   Genitourinary: Negative.  Negative for dysuria.   Musculoskeletal: Negative.  Negative for myalgias.   Skin: Negative.  Negative for rash.   Neurological:  Positive for sensory change. Negative for dizziness, tingling and headaches.   Endo/Heme/Allergies: Negative.    Psychiatric/Behavioral: Negative.  Negative for depression, hallucinations and suicidal ideas.    All other systems reviewed and are negative.    Past Medical History   has a past medical history of Patient denies medical problems.    Surgical History   has no past surgical history on file.     Family History  Mom with diabetes   Family history reviewed with patient.     Social History  Tobacco: Smoked 1 pack/day for 15 years, quit 4 years ago.  Turned to vaping.  For the past month, using nicotine gum.  Stop smoking and vaping.  Still has cravings.  Alcohol: Drinks 4-5 shots of which ever alcohol he can find.  Decreased frequency in the past month, approximately 3 times per week.  Recreational drugs (illegal or prescription): Denies  Employment:   Living Situation: Lives by himself in an apartment in third floor  Recent Travel: Denies  Primary Care Provider: Reviewed  Other (stressors, spirituality, exposures): Denies    Allergies  No Known Allergies    Medications  None       Physical Exam  Temp:  [36.2 °C (97.1 °F)-36.4 °C (97.6 °F)] 36.2 °C (97.1 °F)  Pulse:  [59-79] 59  Resp:  [16-18] 16  BP: (141-186)/(4-97) 159/4  SpO2:  [94 %-98 %] 97 %  Blood Pressure: (!) 141/78   Temperature: 36.4 °C (97.6 °F)   Pulse:  69   Respiration: 16   Pulse Oximetry: 98 %       Physical Exam  Vitals and nursing note reviewed.   Constitutional:       General: He is not in acute distress.     Appearance: Normal appearance. He is not ill-appearing, toxic-appearing or diaphoretic.   Cardiovascular:      Rate and Rhythm: Normal rate and regular rhythm.      Pulses: Normal pulses.      Heart sounds: Normal heart sounds. No murmur heard.  Pulmonary:      Effort: Pulmonary effort is normal. No respiratory distress.      Breath sounds: Normal breath sounds. No stridor. No wheezing or rales.   Abdominal:      General: Bowel sounds are normal. There is no distension.      Palpations: Abdomen is soft.      Tenderness: There is no abdominal tenderness. There is no guarding.   Musculoskeletal:         General: No swelling or tenderness. Normal range of motion.      Right lower leg: No edema.      Left lower leg: No edema.   Skin:     General: Skin is warm.      Capillary Refill: Capillary refill takes less than 2 seconds.      Coloration: Skin is not jaundiced or pale.      Findings: No bruising.   Neurological:      Mental Status: He is alert.      Cranial Nerves: No cranial nerve deficit.      Sensory: Sensory deficit present.      Motor: No weakness.      Coordination: Coordination normal.      Gait: Gait normal.      Deep Tendon Reflexes: Reflexes normal.   Psychiatric:         Mood and Affect: Mood normal.         Behavior: Behavior normal.         Thought Content: Thought content normal.         Judgment: Judgment normal.         Laboratory:  Recent Labs     08/15/24  1915 08/15/24  2206   WBC 6.2 6.5   RBC 5.14 5.03   HEMOGLOBIN 16.2 16.0   HEMATOCRIT 47.4 46.0   MCV 92.2 91.5   MCH 31.5 31.8   MCHC 34.2 34.8   RDW 43.0 44.2   PLATELETCT 218 232   MPV 10.1 10.1     Recent Labs     08/15/24  1915   SODIUM 140   POTASSIUM 4.0   CHLORIDE 106   CO2 20   GLUCOSE 107*   BUN 13   CREATININE 0.80   CALCIUM 9.1     Recent Labs     08/15/24  1915   ALTSGPT  "12   ASTSGOT 16   ALKPHOSPHAT 40   TBILIRUBIN 0.3   GLUCOSE 107*     Recent Labs     08/15/24  1915   APTT 28.1   INR 0.95     No results for input(s): \"NTPROBNP\" in the last 72 hours.      Recent Labs     08/15/24  1915   TROPONINT <6       Imaging:  MR-BRAIN-WITH & W/O    (Results Pending)   EC-ECHOCARDIOGRAM COMPLETE W/O CONT    (Results Pending)       no X-Ray or EKG requiring interpretation    Assessment/Plan: 34-year-old male admitted with dysphagia and paresthesia in right upper limb.  Negative CTA head/neck and perfusion, apparently incidental hypoplastic left vertebral artery.  Evaluated by neuro recommending admission for brain MRI.  * Bulbar weakness (HCC)- (present on admission)  Assessment & Plan  NIHSS 1.  Presenting with 12 hours history of dysphagia and right upper limb paresthesia.  No other signs of focalization.  Negative head/neck CTA and perfusion, but hypoplastic artery.  Evaluated by neurology, recommending admission for brain MRI.  Specialist does not believe that hypoplastic artery is cause.  Broad differential including MS, myasthenia, GBS and stroke.    Unlikely myasthenia due to acute onset.  No prior respiratory/GI symptoms, unlikely GBS and Laura Bahena. Symptoms persist.  Low suspicion for heart embolism, no arrhythmia.  Consider echo if positive MRI.  Stat brain MRI  Telemetry  Neurochecks every 4 hours  A1c and lipid profile  Aspiration precautions  N.p.o. pending bedside swallow and speech evaluation  Checking antibodies for myasthenia  Neuro following     Alcohol use disorder  Assessment & Plan  Patient drinks 4-5 shots daily, recently cut back frequency.  Any drink as long as 40% alcohol.  No family history of substance problems.  No history of cirrhosis but no prior workup.  Lab work okay so far.  I discussed cessation with patient including resources and starting on naltrexone.   Alcohol cessation discussed with patient for 4 minutes.    Tobacco use disorder  Assessment & " Plan  Patient used to smoke 1 pack a day for at least 4 years, then vaped.  For the past month, nicotine gum.  Quit smoking and vaping.    I discussed cessation with patient including continuing nicotine gum.  I also discussed medications to help with cessation with patient including Wellbutrin and Chantix.  Smoking cessation discussed with patient for 4 minutes.        VTE prophylaxis: SCDs/TEDs and enoxaparin ppx    I anticipate this patient will require at least two midnights for appropriate medical management, necessitating inpatient admission because rule out stroke    Patient will need a Telemetry bed on MEDICAL service .  The need is secondary to rule out stroke.

## 2024-08-16 NOTE — ASSESSMENT & PLAN NOTE
NIHSS 1.  Presenting with 12 hours history of dysphagia and right upper limb paresthesia.  No other signs of focalization.  Negative head/neck CTA and perfusion, but hypoplastic artery.  Evaluated by neurology, recommending admission for brain MRI.  Specialist does not believe that hypoplastic artery is cause.  Broad differential including MS, myasthenia, GBS and stroke.    Unlikely myasthenia due to acute onset.  No prior respiratory/GI symptoms, unlikely GBS and Laura Bahena. Symptoms persist.  Low suspicion for heart embolism, no arrhythmia.  Consider echo if positive MRI.  Stat brain MRI  Telemetry  Neurochecks every 4 hours  A1c and lipid profile  Aspiration precautions  N.p.o. pending bedside swallow and speech evaluation  Checking antibodies for myasthenia  Neuro following

## 2024-08-16 NOTE — CONSULTS
"Vascular Neurology Initial Consult H&P  Neurovascular Service, [facility]    Referring Physician: Jim Hoyos*    Chief Complaint   Patient presents with    Sent by MD     Transfer from [facility]. Transferred for further evaluation of RUE tingling and difficulty swallowing.        HPI: Fernie Souza is a 34 y.o. male  with no known prior medical history presenting with dysphagia and right upper arm paresthesia. On 8/15 around noon he noticed a feeling of a \"lump\" in his throat and had to try a few times to swallow but no choking. Later in the night when drinking a shake he could not swallow and had choking. Later when getting in shower he noted he could not feel the water temperature on his right arm and had tingling. 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 Transferred to [facility] where he had MRI brain that revealed a tiny acute infarct in left inferior cerebellar peduncle and left vertebral artery occlusion suspicious for dissection. Symptoms have gradually improved since admission and now he does not feel any difficulty swallowing, still has decreased temperature sensation in left arm.     Works as a . No family or personal history of stroke. No known recent neck manipulation or trauma. Has quit smoking 1 month ago, on nicotine gum. Drinks 4-5 shots of alcohol 3-4 times per week.     Review of systems: In addition to what is detailed in the HPI above, all other systems reviewed and are negative.    Past Medical History:    has a past medical history of Patient denies medical problems.    FHx:  family history is not on file.    SHx:   reports that he has quit smoking. His smoking use included cigarettes. He has never used smokeless tobacco. He reports current alcohol use. He reports that he does not currently use drugs.    Allergies:  No Known Allergies    Medications:    Current " "Facility-Administered Medications:     aspirin (Asa) chewable tab 81 mg, 81 mg, Oral, DAILY **OR** aspirin (Asa) suppository 300 mg, 300 mg, Rectal, DAILY, Armando R Reyes Yparraguirre, M.D.    hydrALAZINE (Apresoline) injection 10 mg, 10 mg, Intravenous, Q6HRS PRN, Armando R Reyes Yparraguirre, M.D.    thiamine (Vitamin B-1) tablet 100 mg, 100 mg, Oral, DAILY, Rachana Zhao M.D.    folic acid (Folvite) tablet 1 mg, 1 mg, Oral, DAILY, Rachana Zhao M.D.    multivitamin tablet 1 Tablet, 1 Tablet, Oral, DAILY, Rachana Zhao M.D.    enoxaparin (Lovenox) inj 40 mg, 40 mg, Subcutaneous, DAILY AT 1800, Armando R Reyes Yparraguirre, M.D.    acetaminophen (Tylenol) tablet 650 mg, 650 mg, Oral, Q6HRS PRN, Armando R Reyes Yparraguirre, M.D.    senna-docusate (Pericolace Or Senokot S) 8.6-50 MG per tablet 2 Tablet, 2 Tablet, Oral, Q EVENING **AND** polyethylene glycol/lytes (Miralax) Packet 1 Packet, 1 Packet, Oral, QDAY PRN, Armando R Reyes Yparraguirre, M.D.    ondansetron (Zofran) syringe/vial injection 4 mg, 4 mg, Intravenous, Q4HRS PRN, Armando R Reyes Yparraguirre, M.D.    ondansetron (Zofran ODT) dispertab 4 mg, 4 mg, Oral, Q4HRS PRN, Armando R Reyes Yparraguirre, M.D.    promethazine (Phenergan) tablet 12.5-25 mg, 12.5-25 mg, Oral, Q4HRS PRN, Armando R Reyes Yparraguirre, M.D.    promethazine (Phenergan) suppository 12.5-25 mg, 12.5-25 mg, Rectal, Q4HRS PRN, Armando R Reyes Yparraguirre, M.D.    prochlorperazine (Compazine) injection 5-10 mg, 5-10 mg, Intravenous, Q4HRS PRN, Armando R Reyes Yparraguirre, M.D.    nicotine (Nicoderm) 21 MG/24HR 21 mg, 21 mg, Transdermal, Daily-0600 **AND** Nicotine Replacement Patient Education Materials, , , Once **AND** nicotine polacrilex (Nicorette) 2 MG piece 2 mg, 2 mg, Oral, Q HOUR PRN, Armando R Reyes Yparraguirre, M.D.      Physical Examination:    /78   Pulse 65   Temp 36.3 °C (97.4 °F) (Temporal)   Resp 16   Ht 1.88 m (6' 2\")   Wt 94.8 kg (209 lb)   SpO2 96%  "  BMI 26.83 kg/m²   General: Patient is awake and in no acute distress  Eye: Examination of optic disks not indicated at this time given acuity of consult  Neck: There is normal range of motion  CV: regular rate   Extremities:  clear, dry, intact, without peripheral edema      NEUROLOGICAL EXAM:   Mental status: Awake, alert and fully oriented  Speech and language: Naming and repetition intact, fluent speech, follows simple, two-step, multi-step and complex commands.  CRANIAL NERVES:  II: Pupils equal and reactive, no VF deficits  III, IV, VI: EOM intact, no gaze preference or deviation, no nystagmus.  V: normal sensation in V1, V2, and V3 segments bilaterally  VII: no asymmetry, no nasolabial fold flattening  VIII: normal hearing to conversation  IX, X: normal palatal elevation, no uvular deviation  XI: 5/5 head turn and 5/5 shoulder shrug bilaterally  XII: midline tongue protrusion  Motor exam: There is sustained antigravity with no downward drift in bilateral arms and legs.  There is no pronator drift. Tone is normal. No abnormal movements were seen on exam.  Sensory exam: Reacts to tactile in all 4 extremities, no neglect to double stim. Sensation intact to vibration, proprioception, fine touch. Impaired to cold in right arm, intact to face and right leg.   Deep tendon reflexes:  2+ throughout. Toes down-going bilaterally.  Coordination: No ataxia on bilateral finger-to-nose testing  Gait: Mild left lateral pulsion during ambulation. No ataxia or weakness noted.       NIHSS: National Institutes of Health Stroke Scale    [0] 1a:Level of Consciousness    0-alert 1-drowsy   2-stupor   3-coma  [0] 1b:LOC Questions                  0-both  1-one      2-neither  [0] 1c:LOC Commands                   0-both  1-one      2-neither  [0] 2: Best Gaze                     0-nl    1-partial  2-forced  [0] 3: Visual Fields                   0-nl    1-partial  2-complete 3-bilat  [0] 4: Facial Paresis                0-nl     "1-minor    2-partial  3-full  MOTOR                       0-nl  [0] 5: Right Arm           1-drift  [0] 6: Left Arm             2-some effort vs gravity  [0] 7: Right Leg           3-no effort vs gravity  [0] 8: Left Leg             4-no movement                             x-untestable  [0] 9: Limb Ataxia                    0-abs   1-1_limb   2-2+_limbs       x-untestable  [1] 10:Sensory                        0-nl    1-partial  2-dense  [0] 11:Best Language/Aphasia         0-nl    1-mild/mod 2-severe   3-mute  [0] 12:Dysarthria                     0-nl    1-mild/mod 2-severe       x-untestable  [0] 13:Neglect/Inattention            0-none  1-partial  2-complete  [1] TOTAL    Baseline Modified [place] Scale (MRS): 0 = No symptoms      Objective Data:    Labs:  Estimated Creatinine Clearance: 130.3 mL/min (by C-G formula based on SCr of 0.92 mg/dL).  Recent Labs     08/15/24  1915 08/15/24  2206 08/16/24  0342   SODIUM 140 139 142   POTASSIUM 4.0 3.7 4.1   CHLORIDE 106 105 107   CO2 20 19* 25   GLUCOSE 107* 94 128*   BUN 13 11 11   CREATININE 0.80 0.71 0.92     Recent Labs     08/15/24  1915 08/15/24  2206 08/16/24  0342   GLUCOSE 107* 94 128*     Recent Labs     08/15/24  1915 08/15/24  2206 08/16/24  0342   ASTSGOT 16 13 11*   ALTSGPT 12 9 9   ALKPHOSPHAT 40 39 38     Recent Labs     08/15/24  1915 08/15/24  2206   WBC 6.2 6.5   HEMOGLOBIN 16.2 16.0   PLATELETCT 218 232     Lab Results   Component Value Date/Time    PROTHROMBTM 13.2 08/15/2024 07:15 PM    INR 0.95 08/15/2024 07:15 PM      Lab Results   Component Value Date/Time    TROPONINT <6 08/15/2024 07:15 PM     Lab Results   Component Value Date/Time    HBA1C 5.8 (H) 08/15/2024 10:06 PM     No results found for: \"LDL\", \"HDL\", \"TRIGLYCERIDE\", \"CHOLSTRLTOT\"    Imaging/Testing:    I interpreted and/or reviewed the patient's neuroimaging    MR-BRAIN-WITH & W/O   Final Result      1.  Nonvisualization of the flow void of intracranial left vertebral artery " consistent left vertebral occlusion likely secondary to dissection. The recent CT angiogram demonstrated occluded left vertebral artery. There is patent right vertebral and    basilar arteries.   2.  There is tiny area of acute infarct in the left inferior cerebellar peduncle.   3.  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      EC-ECHOCARDIOGRAM COMPLETE W/O CONT    (Results Pending)   DX-ESOPHAGUS - [id number]    (Results Pending)       Assessment and Plan:  Fernie Souza is a 34 y.o. male presenting for dysphagia and right arm paresthesia whom neurology has been consulted for acute infarct on MRI. Tiny acute infarct in left inferior cerebellar peduncle. Has distal left vertebral occlusion, suspect dissection based on MRI but unable to clearly visualize on CTA. No known neck manipulation / trauma. Overall he is feeling better with improving dysphagia. Right arm still has impaired temperature sensation and has some mild left lateral pulsion on ambulation. Recommend 6 months anticoagulation with Eliquis for suspected dissection. Can follow up with vascular neurology in stroke bridge clinic.     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 Normotension per primary team, -140  - Initiate Eliquis 5mg BID, will need to continue for 6 months (through February 11th). No need for loading dose.   - Does not need cardioembolic workup with TTE / Zio. Suspected etiology is vertebral dissection  - Target normoglycemia  while admitted  - PT/OT/SLP. MBSS pending.   - DVT prophylaxis: SCDs, Eliquis  - Recommend alcohol cessation, encouraged continued abstinence from smoking.  - No further inpatient testing or interventions required from our standpoint, will sign  off and follow up with him in stroke bridge clinic.       NATI Floyd  Vascular Neurology, Acute Care Services     The evaluation of the patient, and recommended management, was discussed with Dr. Hoyos, attending Vascular Neurologist.

## 2024-08-16 NOTE — ASSESSMENT & PLAN NOTE
Patient drinks 4-5 shots daily, recently cut back frequency.  Any drink as long as 40% alcohol.  No family history of substance problems.  No history of cirrhosis but no prior workup.  Lab work okay so far.  I discussed cessation with patient including resources and starting on naltrexone.   Alcohol cessation discussed with patient for 4 minutes.

## 2024-08-16 NOTE — PROGRESS NOTES
Brief neurology note      34-year-old male presents with right sided sensation changes with hot and cold sensations.  Some numbness.  Also difficulty swallowing.  Sounds like some bulbar weakness.  ERP performed a CTA head and neck which shows a hypoplastic left vertebral artery that occludes intracranially.  I believe this is an incidental finding.  This is a typical variant.  I do not think this is causing current symptoms.  However agree with ERP for transfer to main campus for a workup for this new onset bulbar weakness and right-sided sensation changes.  Differential is broad.  Includes MS, myasthenia, GBS variants and possibly stroke.  Patient will need an MRI brain with/without contrast.  Neurology will follow-up tomorrow.

## 2024-08-16 NOTE — ED PROVIDER NOTES
ED Provider Note    CHIEF COMPLAINT  Chief Complaint   Patient presents with    Sent by MD     Transfer from [facility]. Transferred for further evaluation of RUE tingling and difficulty swallowing.        EXTERNAL RECORDS REVIEWED  External ED Note [facility] ED documentation reviewed     HPI/ROS  LIMITATION TO HISTORY   Select: : None  OUTSIDE HISTORIAN(S):  N/A    Fernie Souza is a 34 y.o. male who presents who presents to the emergency department as a transfer from [facility] for neurology evaluation.  Today at 12 PM he developed sensory changes in his right upper extremity, specifically noting that he could not tell the temperature of the water in the shower.  Subsequently when he attempted to drink he noticed that took more effort to swallow.  Denies choking or concern for food impaction.  No recent head trauma.  He takes no medications.  Denies alcohol use, drug use, tobacco use.    CT head and CTA head/neck showed a possible left vertebral artery occlusion and severely hypoplastic left vertebral artery.  The ED physician at [facility] consulted neurology, Dr. 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 Transferred for MR imaging and neurology evaluation.     PAST MEDICAL HISTORY   has a past medical history of Patient denies medical problems.    SURGICAL HISTORY  patient denies any surgical history    FAMILY HISTORY  History reviewed. No pertinent family history.    SOCIAL HISTORY  Social History     Tobacco Use    Smoking status: Former     Types: Cigarettes    Smokeless tobacco: Never   Vaping Use    Vaping status: Former    Substances: pt reports stopping vaping approx 2 wks prior, currently using nicotine gum   Substance and Sexual Activity    Alcohol use: Yes     Comment: social/weekends    Drug use: Not Currently    Sexual activity: Not on file       CURRENT MEDICATIONS  Home Medications   "     Reviewed by Princess Carrasco R.N. (Registered Nurse) on 08/16/24 at 0102  Med List Status: Complete     Medication Last Dose Status   Melatonin 10 MG Tab 8/14/2024 Active                  Audit from Redirected Encounters    **Home medications have not yet been reviewed for this encounter**       ALLERGIES  No Known Allergies    PHYSICAL EXAM  VITAL SIGNS: /75   Pulse 61   Temp 36.4 °C (97.5 °F) (Temporal)   Resp 16   Ht 1.88 m (6' 2\")   Wt 94.8 kg (209 lb)   SpO2 96%   BMI 26.83 kg/m²    General: Well-appearing, no acute distress.   Eyes: No scleral icterus. EOM grossly intact BL.  HENT: Oropharynx clear, uvula midline, symmetric tonsils without exudates.   Neck: Normal ROM. No cervical lymphadenopathy or swelling.   Lungs: Non-labored breathing. Clear to auscultation bilaterally. No wheezing or crackles.  Cardiac: Regular rate and rhythm. No murmurs. No lower extremity swelling. Equal and symmetric distal pulses. Well-perfused.  Abdomen: Soft, non-tender, non-distended. No rebound or guarding.   MSK: Symmetric movement of all extremities.  Skin: No rashes, lesions, bruising, or petechiae. Well-perfused.   NEURO:   Mental Status: Alert and oriented x 3, normal mentation, normal fund of knowledge.  Speech: No aphasia, no dysarthria.  Cranial Nerves: Pupils equal and reactive, no nystagmus, no visual field deficits, normal facial sensation bilaterally, normal facial symmetry, no nasolabial fold flattening, normal hearing, uvula midline and normal palate elevation, midline tongue protrusion, 5/5 shoulder shrug and head turn.  Motor: 5/5 strength in the right and left shoulders, elbow flexors/extensors, wrist flexors/extensors, finger abductors/adductors.  5/5 strength in the right and left hip flexors/extensors, knee flexors/extensors, ankle dorsiflexors and plantar flexors.  Sensation: Subjective sensory changes in the right upper extremity.   Cerebellar: Normal finger-to-nose and heel-to-shin " testing.  Station: Not assessed.   Gait: Not assessed.     EKG/LABS  Work up from [facility] ED reviewed: CBC and CMP without actionable abnormalities.  EKG without ischemic changes.    RADIOLOGY/PROCEDURES   I have independently interpreted the diagnostic imaging associated with this visit and am waiting the final reading from the radiologist.     Radiologist interpretation:  MR-BRAIN-WITH & W/O   Final Result      1.  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 The recent CT angiogram demonstrated occluded left vertebral artery. There is patent right vertebral and    basilar arteries.   2.  There is tiny area of acute infarct in the left inferior cerebellar peduncle.   3.  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      DX-ESOPHAGUS - [id number]    (Results Pending)       COURSE & MEDICAL DECISION MAKING    ASSESSMENT, COURSE AND PLAN  Care Narrative:   Fernie is a 34-year-old male who presents to the emergency department as a transfer from [facility] emergency department for evaluation of dysphagia/difficulty swallowing and right upper extremity sensory changes that started at 12 PM.  CT imaging obtained at [facility] showed a possible left vertebral artery occlusion and severely hypoplastic left vertebral artery.  The emergency physician at [facility] consulted neurology, Dr. Hoyos, who reviewed the images and does not believe the hypoplastic vertebral artery is the cause of his symptoms.  Differential includes but is not limited to myasthenia gravis, GBS, stroke, congenital vascular malformation, vascular dissection.  On my initial assessment, patient is resting comfortably in bed.  His neurologic exam is nonfocal aside from right upper extremity subjective sensory changes.  Vital signs are within normal limits.      I spoke with   Soha to confirm the plan.  Plan to admit for MR brain with and without contrast.  No medical intervention at this time.  I spoke with the admitting inpatient hospitalist who agreed to admission.  He was admitted in stable condition.    National Institutes of Health (NIH) Stroke Scale   NIH Stroke Scale    Level of Consciousness: Alert, Keenly Responsive  Ask Month and Age: Both Questions Right  Blink Eyes and Squeeze Hands: Performs Both Tasks  Best Gaze: Normal  Visual: No Visual Loss  Facial Palsy: Normal Symmetrical Movements  Motor, Left Arm: No Drift  Motor, Right Arm: No Drift  Motor, Left Leg: No Drift  Motor, Right Leg: No Drift  Limb Ataxia: Absent  Sensory Loss: Mild-to-Moderate Sensory Loss  Best Language: No Aphasia  Dysarthria: Normal  Extinction and Inattention: No Abnormality    NIHSS Score: 1    No, this patient is not a candidate for thrombolytic therapy because outside of thrombolytic window, and uncertain diagnosis at this time.   ADDITIONAL PROBLEMS MANAGED  N/A    DISPOSITION AND DISCUSSIONS  I have discussed management of the patient with the following physicians and YARITZA's:  Neurology (Dr. Hoyos), inpatient admitting hospitalist    Discussion of management with other [facility] or appropriate source(s): None     Escalation of care considered, and ultimately not performed: N/A     Barriers to care at this time, including but not limited to:  None .     Decision tools and prescription drugs considered including, but not limited to:  N/A  .    FINAL DIAGNOSIS  1. Dysphagia, unspecified type    2. Numbness and tingling of right arm    3. Bulbar weakness (HCC)         Electronically signed by: Elzbieta Bahena D.O., 8/15/2024 10:13 PM

## 2024-08-16 NOTE — THERAPY
Speech Language Pathology   Videofluoroscopic Swallow Study Evaluation     Patient Name: Fernie Souza  AGE:  34 y.o., SEX:  male  Medical Record #: [id number]  Date of Service: 8/16/2024      History of Present Illness    33 y/o admitted on 8/15 for RUE tingling and difficulty swallowing.    MRI of brain 8/16:  1.  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 The recent CT angiogram demonstrated occluded left vertebral artery. There is patent right vertebral and   basilar arteries.  2.  There is tiny area of acute infarct in the left inferior cerebellar peduncle.  3.  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    CTA of head 8/15:  1.  Distal left vertebral artery occlusion.    CTA of neck 8/15:  1.  Area of nonopacification of the distal left vertebral artery, compatible with occlusion.  2.  Severely hypoplastic left vertebral artery.  3.  Mediastinal and bilateral hilar adenopathy, workup and evaluation for causes of adenopathy recommended as clinically appropriate.    Dx chest 8/15:  No acute cardiopulmonary abnormality.      Pertinent Information  Current Method of Nutrition: NPO until cleared by speech pathology  Patient Behaviors: Anxious  Dentition: Good  Secretion Management: Expectoration of secretions  Factor(s) Affecting Performance: None      Discussed the risks, benefits, and alternatives of the VFSS procedure. Patient/family acknowledged and agreed to proceed.      Assessment  Videofluoroscopic Swallow Study was conducted in the Lateral projection(s) to evaluate oropharyngeal swallow function. A radiology tech was present to assist with the procedure.      Positioning: Seated upright in fluoroscopy chair  Anatomic View: WFL  Bolus Administration: Patient  PO Barium Contrast Trials: Varibar thin, Liquidised mixed with Varibar powder, Varibar  pudding, Regular solid coated with Varibar pudding, Mixed consistency with Varibar powder      Consistency PAS Score Timing Residue Comments   Thin Liquid 4 Pre Swallow, During swallow Vallecular Residue: Moderate (25%-50%)  Pyriform Sinus Residue: Mild (5%-25%)    Liquidised 1 N/A Vallecular Residue: Moderate (25%-50%)  Pyriform Sinus Residue: Trace (1%-5%)    Pudding 4 Pre Swallow Vallecular Residue: Mild (5%-25%)  Pyriform Sinus Residue: Moderate (25%-50%)    Regular Solid 1 N/A Vallecular Residue: None (0%)  Pyriform Sinus Residue: None (0%)    Mixed 4 Pre Swallow Vallecular Residue: Mild (5%-25%)  Pyriform Sinus Residue: Mild (5%-25%)      Penetration-Aspiration Scale (PAS)  1     No contrast enters airway  2     Contrast enters the airway, remains above the vocal folds, and is ejected from the airway (not seen in the airway at the end of the swallow).  3     Contrast enters the airway, remains above the vocal folds, and is not ejected from the airway (is seen in the airway after the swallow).  4     Contrast enters the airway, contacts the vocal folds, and is ejected from the airway.  5     Contrast enters the airway, contacts the vocal folds, and is not ejected from the airway  6     Contrast enters the airway, crosses the plane of the vocal folds, and is ejected from the airway.  7     Contrast enters the airway, crosses the plane of the vocal folds, and is not ejected from the airway despite effort.  8     Contrast enters the airway, crosses the plane of the vocal folds, is not ejected from the airway and there is no response to aspiration.       Oral phase:  Appropriate acceptance of all PO trials. Functional/timely mastication of soft solids and solid textures. Premature spillage into the airway with thin liquids, pudding, and mixed consistency. Pharyngeal swallow triggering at the pyriform sinuses with thin liquids and mixed consistency and vallecular space with pudding and solids.     Pharyngeal  "phase:  Laryngeal mistiming, incomplete laryngeal elevation, incomplete laryngeal vestibule closure, reduced base of tongue retraction, reduced pharyngeal shortening, and impaired epiglottic inversion resulted in:  Penetration before the swallow with thin liquids into the laryngeal vestibule or to level of the vocal folds (VF) which was ejected at the end of the swallow or following a quick reflexive cough. Pt sensate to all penetration events resulting in adequate airway protection. Pt with delayed swallow initiation as bolus fell into the pharynx before the swallow and intermittently \"hacking\" vallecular spillage back up to mouth for re-initiation of the swallow. Swallow was intermittently uncoordinated.  No penetration or aspiration noted with trials of MTL. Delayed onset of swallow, as noted similarly to thin liquids. Moderate vallecular residue noted after the swallow which cleared with a cued second swallow.  Deep penetration before the swallow x1 with pudding as bolus noted to fall to VF which was quickly ejected with a reflexive throat clear. Delayed swallow initiation as bolus fell into pharynx and pt \"hacked\" vallecular spillage back to mouth to help initiate the swallow. Mild vallecular and moderate pyriform sinus residue noted after the swallow.  Deep penetration before the swallow to VF x1 with mixed consistency in which pt had a reflexive throat clear to clear before the swallow initiation. Mild pharyngeal residue noted after the swallow which cleared with a reflexive second swallow    Compensatory Strategies:  Cued second swallow - successful for improving vallecular residue  Reflexive cough/throat clear - successful for ejection of penetration from the laryngeal vestibule      Clinical Impressions  The pt presents with a moderate oropharyngeal dysphagia. This is an acute change in swallow function from pt's baseline. Swallow safety is relatively intact and efficiency is impaired. Pt's swallow is " "currently uncoordinated/effortful. Okay to begin a regular/thin liquid diet with strategies (small bites/sips, slow rate). Hold PO with any difficulty or worsening changes. Throat clearing/coughing from pt is successful for airway protection. Pt will benefit from dysphagia therapy in the acute care setting and in the OP setting.      Recommendations  Diet Consistency: regular/thin liquids  Medication: As tolerated  Supervision: Independent  Positioning: Fully upright and midline during oral intake  Strategies: Small bites/sips  Oral Care: BID  Additional Instrumentation: Repeat diagnostic study when clinically appropriate         SLP Treatment Plan  Treatment Plan: Dysphagia Treatment  SLP Frequency: 4x Per Week  Estimated Duration: Until Therapy Goals Met      Anticipated Discharge Needs  Discharge Recommendations: Recommend outpatient speech therapy services   Therapy Recommendations Upon DC: Dysphagia Training, Community Re-Integration, Patient / Family / Caregiver Education        Patient / Family Goals  Patient / Family Goal #1: \"to figure out what's going on\"  Goal #1 Outcome: Progressing as expected  Short Term Goal # 1: Pt will participate in an MBSS to further assess swallow function and determine safest PO diet  Goal Outcome # 1: Goal met  Short Term Goal # 2: Pt will consume a regular/thin liquid diet with no overt s/sx of aspiration      Zaira Lezama, SLP   "

## 2024-08-16 NOTE — ED NOTES
Medication history reviewed with patient at bedside.   Med rec is complete  Allergies reviewed.   Patient has not had any outpatient antibiotics in the last 30 days.   Anticoagulants: No    Patient states he takes 3x10mg (30mg) Melatonin qhs on a regular basis- patient states last night 8/14/24 he took 6x10mg (60mg) of melatonin.      Edward Osei PhT

## 2024-08-16 NOTE — DISCHARGE INSTRUCTIONS
"Ischemic Stroke  Discharge Instructions    You experienced an Ischemic Stroke.  Ischemic stroke is the most common type of stroke and happens when an artery in the brain becomes blocked by a plaque fragment or blood clot. Typically, these blockages travel from the heart or larger arteries that supply the brain.  The brain needs a constant supply of blood, which carries oxygen and nutrients it needs to function.  A stroke occurs when one of these arteries to the brain is either blocked or bursts. As a result, part of the brain does not get the blood it needs, so it starts to die.         Stroke Risk Factors    You are at increased risk of having another stroke event.  See your Patient Stroke Guide to help reduce your stroke risk. These are your specific risk factors:  Alcohol or Drug Abuse  Gender - Men are at a higher risk than women  High blood pressure  High Cholesterol and lipids  Not taking your medications as prescribed  Previous TIAs or \"mini strokes\"     Get help right away if you have any signs of a stroke.  \"BE FAST\" is an easy way to remember the main warning signs of a stroke:  B - Balance. Dizziness, sudden trouble walking, or loss of balance.  E - Eyes. Trouble seeing or a change in how you see.  F - Face. Sudden weakness or loss of feeling in the face. The face or eyelid may droop on one side.  A - Arms. Weakness or loss of feeling in an arm. This happens all of a sudden and most often on one side of the body.  S - Speech. Sudden trouble speaking, slurred speech, or trouble understanding what people say.  T - Time. Time to call emergency services. Write down what time symptoms started.    "

## 2024-08-16 NOTE — DISCHARGE PLANNING
Follow up for post acute services Current documentation does not support therapy need for IRF level of care. Anticipate home with OP support when medically cleared. No physiatry consult per protocol.

## 2024-08-16 NOTE — DISCHARGE SUMMARY
Discharge Summary    CHIEF COMPLAINT ON ADMISSION  Chief Complaint   Patient presents with    Sent by MD     Transfer from [facility]. Transferred for further evaluation of RUE tingling and difficulty swallowing.        Reason for Admission  Transfer     Admission Date  8/15/2024    CODE STATUS  Full Code    HPI & HOSPITAL COURSE  34 y.o. male with a past medical history of tobacco and alcohol use was admitted on 8/15/2024 for right upper extremity paresthesia and dysphagia.  Head CTA showed distal left vertebral artery occlusion and neck CTA nonopacification of distal left vertebral artery, compatible with occlusion.  He was started on aspirin and high-dose statin.  Follow-up brain MRI showed a small  left peduncle infarct. T2 images shows normal caliber left vertebral artery which was concerning for dissection.  Per neurology patient was transition to a 6-month Eliquis regimen.  He was eval by physical therapy, Occupational Therapy and speech pathology for which patient is to continue outpatient follow-up with physical therapy and speech pathology which was scheduled prior to discharge.  Patient tolerating meals.  Echocardiogram and cardiac monitoring workup were deferred as per neurology's recommendation.  Stable patient with in chronic condition was discharged home Eliquis and was instructed to follow-up with his primary care provider and neurology in the outpatient setting.  Patient was instructed to refrain from extreme sports, high contact activities and ladders while on Eliquis as well as monitoring bowel movements.     All results and plan of action discussed with the patient for she voiced understanding and agreement with the primary care team.  Patient was instructed to return to emergency department symptoms were to worsen.    Therefore, he is discharged in good and stable condition to home with close outpatient follow-up.    The patient recovered much more quickly than anticipated on  admission.    Discharge Date  8/16/2024    FOLLOW UP ITEMS POST DISCHARGE  Primary care provider follow posthospital discharge care  Outpatient neurology to follow CVA care    DISCHARGE DIAGNOSES  Principal Problem:    Bulbar weakness (HCC) (POA: Yes)  Active Problems:    Tobacco use disorder (POA: Unknown)    Alcohol use disorder (POA: Unknown)  Resolved Problems:    * No resolved hospital problems. *      FOLLOW UP  No future appointments.  No follow-up provider specified.    MEDICATIONS ON DISCHARGE     Medication List        START taking these medications        Instructions   apixaban 5mg Tabs  Commonly known as: Eliquis   Take 1 Tablet by mouth 2 times a day. Indications: Thromboembolism secondary to Atrial Fibrillation  Dose: 5 mg            STOP taking these medications      Melatonin 10 MG Tabs              Allergies  No Known Allergies    DIET  Orders Placed This Encounter   Procedures    Diet Order Diet: Regular     Standing Status:   Standing     Number of Occurrences:   1     Order Specific Question:   Diet:     Answer:   Regular [1]       ACTIVITY  As tolerated.  Weight bearing as tolerated    CONSULTATIONS  Neurology    PROCEDURES  None    LABORATORY  Lab Results   Component Value Date    SODIUM 142 08/16/2024    POTASSIUM 4.1 08/16/2024    CHLORIDE 107 08/16/2024    CO2 25 08/16/2024    GLUCOSE 128 (H) 08/16/2024    BUN 11 08/16/2024    CREATININE 0.92 08/16/2024        Lab Results   Component Value Date    WBC 6.5 08/15/2024    HEMOGLOBIN 16.0 08/15/2024    HEMATOCRIT 46.0 08/15/2024    PLATELETCT 232 08/15/2024        Total time of the discharge process exceeds 32 minutes.

## 2024-08-16 NOTE — THERAPY
"Occupational Therapy   Initial Evaluation     Patient Name: Fernie Souza  Age:  34 y.o., Sex:  male  Medical Record #: [id number]  Today's Date: 8/16/2024     Precautions  Precautions: (P) Swallow Precautions    Assessment  Patient is 34 y.o. male who presents to acute w/ dysphagia and R UE paresthesia. PMH includes ETOH and tobacco use. MRI indicated tiny area of acute infarct in the left inferior cerebellar peduncle. Pt demo'd BADLs at SPV level, able to perform fine motor tasks w/ no difficulty, reports R UE sensation appears to be returning back to normal.     Plan    Occupational Therapy Initial Treatment Plan   Duration: (P) Evaluation only    DC Equipment Recommendations: (P) None  Discharge Recommendations: (P) Anticipate that the patient will have no further occupational therapy needs after discharge from the hospital     Subjective    \"I noticed my grammar is a little off with texting\"     Objective      Total Time Spent   OT Evaluation (Minutes) 15   Initial Contact Note    Initial Contact Note Order Received and Verified, Occupational Therapy Evaluation in Progress with Full Report to Follow.   Prior Living Situation   Prior Services None   Housing / Facility 3 Story Apartment / Condo   Bathroom Set up Bathtub / Shower Combination   Equipment Owned None   Lives with - Patient's Self Care Capacity Alone and Able to Care For Self   Comments Pt reports he has some friends in the area   Prior Level of ADL Function   Self Feeding Independent   Grooming / Hygiene Independent   Bathing Independent   Dressing Independent   Toileting Independent   Prior Level of IADL Function   Medication Management Independent   Laundry Independent   Kitchen Mobility Independent   Finances Independent   Home Management Independent   Shopping Independent   Prior Level Of Mobility Independent Without Device in Community;Independent Without Device in Home   Driving / Transportation Driving Independent   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)   Precautions   Precautions Swallow Precautions   Vitals   O2 (LPM) 0   O2 Delivery Device None - Room Air   Pain 0 - 10 Group   Therapist Pain Assessment Post Activity Pain Same as Prior to Activity;Nurse Notified  (no c/o pain during session)   Cognition    Cognition / Consciousness WDL   Level of Consciousness Alert   Comments very pleasent and cooperative   Active ROM Upper Body   Active ROM Upper Body  WDL   Strength Upper Body   Upper Body Strength  WDL   Sensation Upper Body   Comments Pt reported improved sensation in R LE   Balance Assessment   Sitting Balance (Static) Fair +   Sitting Balance (Dynamic) Fair +   Standing Balance (Static) Fair   Standing Balance (Dynamic) Fair   Weight Shift Sitting Fair   Weight Shift Standing Fair   Comments no AD, no LOB   Bed Mobility    Supine to Sit   (in chair pre/post)   Scooting Supervised   ADL Assessment   Grooming Supervision;Standing  (oral care)   Upper Body Dressing Supervision   Lower Body Dressing Supervision  (donned and tied shoes)   How much help from another person does the patient currently need...   Putting on and taking off regular lower body clothing? 4   Bathing (including washing, rinsing, and drying)? 4   Toileting, which includes using a toilet, bedpan, or urinal? 4   Putting on and taking off regular upper body clothing? 4   Taking care of personal grooming such as brushing teeth? 4   Eating meals? 4   6 Clicks Daily Activity Score 24   Functional Mobility   Sit to Stand Supervised   Bed, Chair, Wheelchair Transfer Supervised   Transfer Method Stand Step   Mobility household distance, no AD   Activity Tolerance   Sitting in Chair 10+ min (up pre/post)   Sitting Edge of Bed 5 min   Standing 8 min   Education Group   Role of Occupational Therapist Patient Response Patient;Acceptance;Explanation;Demonstration;Verbal Demonstration;Action Demonstration   Occupational Therapy Initial Treatment Plan     Duration Evaluation only   Problem List   Problem List   (pt endorses difficulty with orquidea when typing)   Anticipated Discharge Equipment and Recommendations   DC Equipment Recommendations None   Discharge Recommendations Anticipate that the patient will have no further occupational therapy needs after discharge from the hospital   Interdisciplinary Plan of Care Collaboration   IDT Collaboration with  Nursing   Patient Position at End of Therapy Seated;Call Light within Reach;Tray Table within Reach;Phone within Reach   Collaboration Comments report given   Session Information   Date / Session Number  8/16, 1x/only

## 2024-08-16 NOTE — THERAPY
"Physical Therapy   Initial Evaluation     Patient Name: Fernie Souza  Age:  34 y.o., Sex:  male  Medical Record #: [id number]  Today's Date: 8/16/2024     Precautions  Precautions: Swallow Precautions    Assessment  Patient is 34 y.o. male that presented to acute with complaints of dysphagia and RUE paresthesia. PMH significant for ETOH and tobacco. MRI indicated tiny area of acute infarct in the left inferior cerebellar peduncle.    Patient presented to PT with impaired balance, impaired sensation, and decreased activity tolerance but he performed functional mobility without physical assist. He mobilized as detailed below. He reported impaired sensation in RUE and sensation of \"being drunk\" or \"on a ship.\" Provided education regarding BE FAST, neuroplasticity, and progression toward PLOF; patient verbalized understanding. Patient will not be actively followed for physical therapy services at this time, however may be seen if requested by physician for 1 more visit within 30 days to address any discharge or equipment needs.      Plan    Physical Therapy Initial Treatment Plan   Duration: Evaluation only    DC Equipment Recommendations: None  Discharge Recommendations: Recommend outpatient physical therapy services to address higher level deficits (vestibular PT)       Subjective    RN cleared patient for therapy; patient received in bed and agreeable     Objective       08/16/24 0921   Time In/Time Out   Therapy Start Time 0903   Therapy End Time 0921   Total Therapy Time 18   Charge Group   PT Evaluation PT Evaluation Mod   Total Time Spent   PT Total Time Yes   PT Evaluation Time Spent (Mins) 18   PT Total Time Spent (Calculated) 18   Initial Contact Note    Initial Contact Note Order Received and Verified, Evaluation Only - Patient Does Not Require Further Acute Physical Therapy at this Time.  However, May Benefit from Post Acute Therapy for Higher Level Functional Deficits.   Precautions   Precautions Swallow " Precautions   Vitals   O2 (LPM) 0   O2 Delivery Device None - Room Air   Pain 0 - 10 Group   Therapist Pain Assessment   (no pain complaint during session)   Prior Living Situation   Prior Services None   Housing / Facility 3 Story Apartment / Condo   Steps Into Home   (2 FOS)   Steps In Home 0   Equipment Owned None   Lives with - Patient's Self Care Capacity Alone and Able to Care For Self   Comments Patient reported some social support that could assist if needed   Prior Level of Functional Mobility   Bed Mobility Independent   Transfer Status Independent   Ambulation Independent   Ambulation Distance community   Assistive Devices Used None   Stairs Independent   History of Falls   History of Falls No   Cognition    Cognition / Consciousness WDL   Level of Consciousness Alert   Comments pleasant, cooperative   Active ROM Upper Body   Comments patient moved BUE functionally during session   Strength Upper Body   Upper Body Strength  WDL   Sensation Upper Body   Comments patient reported impaired sensation RUE and R trunk, light touch and pain intact, patient reported impaired temperature sensation   Active ROM Lower Body    Active ROM Lower Body  WDL   Strength Lower Body   Lower Body Strength  WDL   Sensation Lower Body   Lower Extremity Sensation   WDL   Lower Body Muscle Tone   Lower Body Muscle Tone  WDL   Neurological Concerns   Neurological Concerns Yes   Comments given medical dx and presentation   Coordination Upper Body   Coordination WDL   Coordination Lower Body    Coordination Lower Body  WDL   Balance Assessment   Sitting Balance (Static) Fair +   Sitting Balance (Dynamic) Fair +   Standing Balance (Static) Fair   Standing Balance (Dynamic) Fair   Weight Shift Sitting Fair   Weight Shift Standing Fair   Comments no AD, no overt LOB. Patient reported L veering during prior ambulation, not observed during evaluation   Bed Mobility    Supine to Sit Supervised   Sit to Supine   (NT, left in chair)  "  Scooting Supervised  (seated)   Gait Analysis   Gait Level Of Assist Supervised   Assistive Device None   Distance (Feet) 350   # of Times Distance was Traveled 1   Deviation   (inconsistent ERIC and step length, improved during session)   # of Stairs Climbed 15   Level of Assist with Stairs Supervised   Weight Bearing Status no restrictions   Vision Deficits Impacting Mobility denied   Comments Patient reported sensation of \"being drunk\" or \"on a ship\"   Functional Mobility   Sit to Stand Supervised   Bed, Chair, Wheelchair Transfer Supervised   Transfer Method Stand Step   6 Clicks Assessment - How much HELP from from another person do you currently need... (If the patient hasn't done an activity recently, how much help from another person do you think he/she would need if he/she tried?)   Turning from your back to your side while in a flat bed without using bedrails? 4   Moving from lying on your back to sitting on the side of a flat bed without using bedrails? 4   Moving to and from a bed to a chair (including a wheelchair)? 4   Standing up from a chair using your arms (e.g., wheelchair, or bedside chair)? 4   Walking in hospital room? 4   Climbing 3-5 steps with a railing? 4   6 clicks Mobility Score 24   Education Group   Education Provided Role of Physical Therapist;Cerebral Vascular Accident   Role of Physical Therapist Patient Response Patient;Acceptance;Explanation;Verbal Demonstration   CVA Patient Response Patient;Acceptance;Explanation;Verbal Demonstration   Physical Therapy Initial Treatment Plan    Duration Evaluation only   Problem List    Problems Impaired Balance;Impaired Ambulation;Decreased Activity Tolerance   Anticipated Discharge Equipment and Recommendations   DC Equipment Recommendations None   Discharge Recommendations Recommend outpatient physical therapy services to address higher level deficits  (vestibular PT)   Interdisciplinary Plan of Care Collaboration   IDT Collaboration with  " Nursing   Patient Position at End of Therapy Seated;Call Light within Reach;Tray Table within Reach;Phone within Reach   Collaboration Comments RN aware of visit, response   Session Information   Date / Session Number  8/16 - eval only

## 2024-08-16 NOTE — ASSESSMENT & PLAN NOTE
Patient used to smoke 1 pack a day for at least 4 years, then vaped.  For the past month, nicotine gum.  Quit smoking and vaping.    I discussed cessation with patient including continuing nicotine gum.  I also discussed medications to help with cessation with patient including Wellbutrin and Chantix.  Smoking cessation discussed with patient for 4 minutes.

## 2024-08-16 NOTE — ED PROVIDER NOTES
"Was unremarkable ER Provider Note    Scribed for Dr. Meredith Mccormick D.O. by Kathleen Giordano. 8/15/2024  6:33 PM    Primary Care Provider: No primary care provider noted.    CHIEF COMPLAINT  Chief Complaint   Patient presents with    Numbness     Started at approx 1200 today, reports noting altered sensation in R side of body- hand, arm, leg. Reports \"the temperature feels different\", but states sensation is otherwise the same. No unilateral deficit is appreciated. GCS is 15. Denies h/a, vision changes, unilateral weakness.     Dizziness     Since today. Intermittent.    Sore Throat     Reports feeling that throat was swollen just PTA. Speaks in complete sentences, resp e/u. Denies noted rash, cold sx.       EXTERNAL RECORDS REVIEWED  No pertinent medical records    HPI/ROS  LIMITATION TO HISTORY   Select: : None    OUTSIDE HISTORIAN(S):  None.    Fernie Souza is a 34 y.o. male who presents to the ED for evaluation of acute right upper extremity numbness onset a few hours prior to arrival. The patient reports when he attempted to drink something around noon it would not go down. He describes that when he ate noodles for lunch around 2 p.m. it took more effort to swallow. He notes that he woke up in sweats this morning, which he states is abnormal for him. Denies having choked on a food bolus, head strike, or injuring his shoulder. He adds when showering earlier today, he noticed altered sensation to temperature in his right shoulder and right arm, explaining that it had lessened in his right compared to his left. He confirms family medical history of diabetes. Denies thyroid issues. The patient confirms social history of drinking on weekends. He explains he recently quit vaping and states he quit smoking ten years ago.     PAST MEDICAL HISTORY  Past Medical History:   Diagnosis Date    Patient denies medical problems        SURGICAL HISTORY  History reviewed. No pertinent surgical history.    FAMILY HISTORY  History " "reviewed. No pertinent family history.    SOCIAL HISTORY   reports that he has quit smoking. His smoking use included cigarettes. He has never used smokeless tobacco. He reports current alcohol use. He reports that he does not currently use drugs.    CURRENT MEDICATIONS  Previous Medications    No medications pertinent for review     ALLERGIES  Patient has no known allergies.    PHYSICAL EXAM  BP (!) 186/97   Pulse 79   Temp 36.4 °C (97.6 °F) (Temporal)   Resp 18   Ht 1.88 m (6' 2\")   Wt 94.9 kg (209 lb 3.5 oz)   SpO2 94%   BMI 26.86 kg/m²   Constitutional: Patient is well developed, well nourished.  Mild distress, spitting his saliva into a bag as he is unable to swallow it.  HENT: Normocephalic, atraumatic.  Moist oral mucosa, speaks full sentences but has trouble swallowing.   Neck: Supple with no anterior/posterior cervical adenopathy. Trachea is midline.  Lymphatic: No lymphadenopathy noted.   Cardiovascular: Normal heart rate and Regular rhythm. No murmur,   Thorax & Lungs: Clear and equal breath sounds with good excursion. No respiratory distress, no rhonchi, wheezing or rales.   Abdomen: Bowel sounds normal in all four quadrants. Soft,nontender, no rebound , guarding, palpable masses.   Skin: Warm, Dry    Extremities: Peripheral pulses 4/4  Normal range of motion.   Neurologic: Alert & oriented x 3, Normal motor function,  No facial drooping, subjective decreased sensation on the right upper extremity.  Normal forehead crease, Equal  strength bilaterally.  Normal biceps, triceps, quadricep function, normal dorsi and plantarflexion  Psychiatric: Affect normal, Judgment normal, Mood normal. Mildly anxious.    DIAGNOSTIC STUDIES & PROCEDURES    Labs:   Results for orders placed or performed during the hospital encounter of 08/15/24   CBC WITH DIFFERENTIAL   Result Value Ref Range    WBC 6.2 4.8 - 10.8 K/uL    RBC 5.14 4.70 - 6.10 M/uL    Hemoglobin 16.2 14.0 - 18.0 g/dL    Hematocrit 47.4 42.0 - 52.0 " %    MCV 92.2 81.4 - 97.8 fL    MCH 31.5 27.0 - 33.0 pg    MCHC 34.2 32.3 - 36.5 g/dL    RDW 43.0 35.9 - 50.0 fL    Platelet Count 218 164 - 446 K/uL    MPV 10.1 9.0 - 12.9 fL    Neutrophils-Polys 79.90 (H) 44.00 - 72.00 %    Lymphocytes 12.60 (L) 22.00 - 41.00 %    Monocytes 6.30 0.00 - 13.40 %    Eosinophils 0.50 0.00 - 6.90 %    Basophils 0.50 0.00 - 1.80 %    Immature Granulocytes 0.20 0.00 - 0.90 %    Nucleated RBC 0.00 0.00 - 0.20 /100 WBC    Neutrophils (Absolute) 4.97 1.82 - 7.42 K/uL    Lymphs (Absolute) 0.78 (L) 1.00 - 4.80 K/uL    Monos (Absolute) 0.39 0.00 - 0.85 K/uL    Eos (Absolute) 0.03 0.00 - 0.51 K/uL    Baso (Absolute) 0.03 0.00 - 0.12 K/uL    Immature Granulocytes (abs) 0.01 0.00 - 0.11 K/uL    NRBC (Absolute) 0.00 K/uL   COMP METABOLIC PANEL   Result Value Ref Range    Sodium 140 135 - 145 mmol/L    Potassium 4.0 3.6 - 5.5 mmol/L    Chloride 106 96 - 112 mmol/L    Co2 20 20 - 33 mmol/L    Anion Gap 14.0 7.0 - 16.0    Glucose 107 (H) 65 - 99 mg/dL    Bun 13 8 - 22 mg/dL    Creatinine 0.80 0.50 - 1.40 mg/dL    Calcium 9.1 8.4 - 10.2 mg/dL    Correct Calcium 8.9 8.5 - 10.5 mg/dL    AST(SGOT) 16 12 - 45 U/L    ALT(SGPT) 12 2 - 50 U/L    Alkaline Phosphatase 40 30 - 99 U/L    Total Bilirubin 0.3 0.1 - 1.5 mg/dL    Albumin 4.3 3.2 - 4.9 g/dL    Total Protein 7.1 6.0 - 8.2 g/dL    Globulin 2.8 1.9 - 3.5 g/dL    A-G Ratio 1.5 g/dL   TROPONIN   Result Value Ref Range    Troponin T <6 6 - 19 ng/L   APTT   Result Value Ref Range    APTT 28.1 24.7 - 36.0 sec   PROTHROMBIN TIME (INR)   Result Value Ref Range    PT 13.2 12.0 - 14.6 sec    INR 0.95 0.87 - 1.13   TSH   Result Value Ref Range    TSH 1.020 0.380 - 5.330 uIU/mL   URINE DRUG SCREEN   Result Value Ref Range    Amphetamines Urine Negative Negative    Barbiturates Negative Negative    Benzodiazepines Negative Negative    Cocaine Metabolite Negative Negative    Fentanyl, Urine Negative Negative    Methadone Negative Negative    Opiates Negative  Negative    Oxycodone Negative Negative    Phencyclidine -Pcp Negative Negative    Propoxyphene Negative Negative    Cannabinoid Metab Negative Negative   FREE THYROXINE   Result Value Ref Range    Free T-4 1.39 0.93 - 1.70 ng/dL   ESTIMATED GFR   Result Value Ref Range    GFR (CKD-EPI) 118 >60 mL/min/1.73 m 2   EKG   Result Value Ref Range    Report       [facility] Emergency Dept.    Test Date:  2024-08-15  Pt Name:    SIL GUPTA                   Department: [facility]  MRN:        [id number]                      Room:  Gender:     Male                         Technician: [id number]  :        1990                   Requested By:ER TRIAGE PROTOCOL  Order #:    [id number]                    Reading MD:    Measurements  Intervals                                Axis  Rate:       74                           P:          66  NH:         133                          QRS:        11  QRSD:       109                          T:          44  QT:         387  QTc:        430    Interpretive Statements  Sinus rhythm  No previous ECG available for comparison       All labs reviewed by me.    EKG:   I have independently interpreted this EKG     Radiology:   The attending Emergency Physician has independently interpreted the diagnostic imaging associated with this visit and is awaiting the final reading from the radiologist, which will be displayed below.    Radiologist interpretation:    CT-CTA NECK WITH & W/O-POST PROCESSING   Final Result         1.  Area of nonopacification of the distal left vertebral artery, compatible with occlusion.   2.  Severely hypoplastic left vertebral artery.   3.  Mediastinal and bilateral hilar adenopathy, workup and evaluation for causes of adenopathy recommended as clinically appropriate.      These findings were discussed with the patient's clinician, Meredith Mccormick, on 8/15/2024 8:18 PM.      CT-CTA HEAD WITH & W/O-POST PROCESS   Final Result         1.  Distal left  "vertebral artery occlusion.      These findings were discussed with the patient's clinician, Meredith Mccormick, on 8/15/2024 8:15 PM.      CT-CEREBRAL PERFUSION ANALYSIS   Final Result      1. Cerebral blood flow less than 30% possibly representing completed infarct = 0 mL. Based on distribution of this finding, this is unlikely to represent artifact.      2. T Max more than 6 seconds possibly representing combination of completed infarct and ischemia = 0 mL. Based on the distribution of this finding, this is unlikely to represent artifact.      3. Mismatched volume possibly representing ischemic brain/penumbra= 0 mL      4.  Please note that this cerebral perfusion study and report is Quantitative and targets supratentorial (cerebral) perfusion for evaluation of large vessel territory acute ischemia/infarction. For example, lacunar infarcts, and brainstem/posterior fossa    ischemia/infarction are not evaluated on this study.  Data acquisition is subject to artifacts which can yield non-anatomically plausible perfusion maps which may be due to motion, bolus timing, signal to noise ratio, or other technical factors.    Perfusion map abnormalities which show non-anatomic distributions are likely artifact.   This study is not \"stand-alone\" and should only be utilized for diagnosis, management/treatment in correlation with CT, CTA, and/or MRI and clinical factors.         DX-CHEST-PORTABLE (1 VIEW)   Final Result      No acute cardiopulmonary abnormality.              COURSE & MEDICAL DECISION MAKING    ED Observation Status? No; Patient does not meet criteria for ED Observation.     INITIAL ASSESSMENT AND PLAN  Care Narrative:       6:33 PM - Patient seen and evaluated at bedside. Discussed plan of care, including obtaining labs and imaging. Patient agrees to plan of care. Ordered PT, APTT, Troponin, CMP, CBC w/ diff, Urine Drug Screen, TSH, CT-CTA Head w/ & w/o, DX-Chest, CT-CTA Neck w/ & w/o, and CT-Cerebral Perfusion " to evaluate. Differential diagnoses include but are not limited to: Acute CVA, TIA, or Thyroid.     Patient's laboratories were really unremarkable.  He has a normal white count, stable H&H, normal CMP, EKG shows sinus rhythm at a rate of 74 bpm with no acute ST elevation or depression, no ventricular ectopy.  Chest x-ray shows no acute cardiopulmonary disease and CT head was unremarkable other than the left distal vertebral artery occlusion per Dr. Huff      8:20 PM -  I discussed the patient's case and Head CT results with Dr. Callejas (Radiology) who informed me the patient has a left distal vertebral artery occlusion. Plans to transfer the patient to [facility]. Patient verbalizes understanding and agreement to this plan of care.     8:22 PM - Paged Dr. Hoyos (Neurology) at this time.    8:42 PM - I discussed the patient's case and the above findings with Dr. 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 Dr. Hoyos informed of me of the plan of care to obtain an MRI with and without contrast of the patient's brain. The patient does not need intervention at this time.    8:47 PM - Patient was reevaluated at bedside. Discussed lab and radiology results with the patient and informed them of the updated plan of care. Patient verbalizes understanding and agreement to this plan of care.     9:05 PM - Dr. Moreno -Emergency Medicine agrees to accept the patient to the emergency department.  He is currently in guarded condition.                     DISPOSITION AND DISCUSSIONS  I have discussed management of the patient with the following physicians and YARITZA's: Dr. Callejas (Radiology), Dr. Hoyos (Neurology), and Dr. Moreno (Emergency Medicine)    Discussion of management with other [facility] [place] or appropriate source(s): None     Barriers to care at this time, including but not limited to: Patient  does not have established PCP.     Decision tools and prescription drugs considered including, but not limited to: Patient will be transferred to [facility] for stat MRI of the brain with and without contrast,with plans for admission and further neurologic workup.    DISPOSITION:  Patient will be transferred to Dr. Moreno (Emergency Medicine) in guarded condition.    FINAL IMPRESSION   1. Numbness and tingling of right arm    2. Esophageal dysphagia      Kathleen ARMENTA (Scribe), am scribing for, and in the presence of, Meredith Mccormick D.O..    Electronically signed by: Kathleen Giordano (Scribe), 8/15/2024    IMeredith D.O. personally performed the services described in this documentation, as scribed by Kathleen Giordano in my presence, and it is both accurate and complete.    The note accurately reflects work and decisions made by me.  Meredith Mccormick D.O.  8/15/2024  11:10 PM

## 2024-09-05 SDOH — ECONOMIC STABILITY: FOOD INSECURITY: WITHIN THE PAST 12 MONTHS, THE FOOD YOU BOUGHT JUST DIDN'T LAST AND YOU DIDN'T HAVE MONEY TO GET MORE.: NEVER TRUE

## 2024-09-05 SDOH — ECONOMIC STABILITY: FOOD INSECURITY: WITHIN THE PAST 12 MONTHS, YOU WORRIED THAT YOUR FOOD WOULD RUN OUT BEFORE YOU GOT MONEY TO BUY MORE.: NEVER TRUE

## 2024-09-05 SDOH — HEALTH STABILITY: PHYSICAL HEALTH: ON AVERAGE, HOW MANY MINUTES DO YOU ENGAGE IN EXERCISE AT THIS LEVEL?: 80 MIN

## 2024-09-05 SDOH — ECONOMIC STABILITY: INCOME INSECURITY: IN THE LAST 12 MONTHS, WAS THERE A TIME WHEN YOU WERE NOT ABLE TO PAY THE MORTGAGE OR RENT ON TIME?: NO

## 2024-09-05 SDOH — ECONOMIC STABILITY: HOUSING INSECURITY
IN THE LAST 12 MONTHS, WAS THERE A TIME WHEN YOU DID NOT HAVE A STEADY PLACE TO SLEEP OR SLEPT IN A SHELTER (INCLUDING NOW)?: NO

## 2024-09-05 SDOH — ECONOMIC STABILITY: INCOME INSECURITY: HOW HARD IS IT FOR YOU TO PAY FOR THE VERY BASICS LIKE FOOD, HOUSING, MEDICAL CARE, AND HEATING?: NOT HARD AT ALL

## 2024-09-05 SDOH — HEALTH STABILITY: PHYSICAL HEALTH: ON AVERAGE, HOW MANY DAYS PER WEEK DO YOU ENGAGE IN MODERATE TO STRENUOUS EXERCISE (LIKE A BRISK WALK)?: 5 DAYS

## 2024-09-05 SDOH — ECONOMIC STABILITY: TRANSPORTATION INSECURITY
IN THE PAST 12 MONTHS, HAS LACK OF TRANSPORTATION KEPT YOU FROM MEETINGS, WORK, OR FROM GETTING THINGS NEEDED FOR DAILY LIVING?: NO

## 2024-09-05 SDOH — HEALTH STABILITY: MENTAL HEALTH
STRESS IS WHEN SOMEONE FEELS TENSE, NERVOUS, ANXIOUS, OR CAN'T SLEEP AT NIGHT BECAUSE THEIR MIND IS TROUBLED. HOW STRESSED ARE YOU?: RATHER MUCH

## 2024-09-05 ASSESSMENT — SOCIAL DETERMINANTS OF HEALTH (SDOH)
IN A TYPICAL WEEK, HOW MANY TIMES DO YOU TALK ON THE PHONE WITH FAMILY, FRIENDS, OR NEIGHBORS?: MORE THAN THREE TIMES A WEEK
HOW OFTEN DO YOU ATTEND CHURCH OR RELIGIOUS SERVICES?: NEVER
DO YOU BELONG TO ANY CLUBS OR ORGANIZATIONS SUCH AS CHURCH GROUPS UNIONS, FRATERNAL OR ATHLETIC GROUPS, OR SCHOOL GROUPS?: NO
HOW OFTEN DO YOU ATTENT MEETINGS OF THE CLUB OR ORGANIZATION YOU BELONG TO?: NEVER
HOW OFTEN DO YOU GET TOGETHER WITH FRIENDS OR RELATIVES?: MORE THAN THREE TIMES A WEEK
HOW OFTEN DO YOU ATTENT MEETINGS OF THE CLUB OR ORGANIZATION YOU BELONG TO?: NEVER
HOW OFTEN DO YOU ATTEND CHURCH OR RELIGIOUS SERVICES?: NEVER
DO YOU BELONG TO ANY CLUBS OR ORGANIZATIONS SUCH AS CHURCH GROUPS UNIONS, FRATERNAL OR ATHLETIC GROUPS, OR SCHOOL GROUPS?: NO
HOW OFTEN DO YOU GET TOGETHER WITH FRIENDS OR RELATIVES?: MORE THAN THREE TIMES A WEEK
HOW OFTEN DO YOU HAVE SIX OR MORE DRINKS ON ONE OCCASION: NEVER
HOW OFTEN DO YOU GET TOGETHER WITH FRIENDS OR RELATIVES?: MORE THAN THREE TIMES A WEEK
HOW OFTEN DO YOU HAVE A DRINK CONTAINING ALCOHOL: MONTHLY OR LESS
HOW OFTEN DO YOU ATTENT MEETINGS OF THE CLUB OR ORGANIZATION YOU BELONG TO?: NEVER
ARE YOU MARRIED, WIDOWED, DIVORCED, SEPARATED, NEVER MARRIED, OR LIVING WITH A PARTNER?: NEVER MARRIED
HOW HARD IS IT FOR YOU TO PAY FOR THE VERY BASICS LIKE FOOD, HOUSING, MEDICAL CARE, AND HEATING?: NOT HARD AT ALL
IN THE PAST 12 MONTHS, HAS THE ELECTRIC, GAS, OIL, OR WATER COMPANY THREATENED TO SHUT OFF SERVICE IN YOUR HOME?: NO
ARE YOU MARRIED, WIDOWED, DIVORCED, SEPARATED, NEVER MARRIED, OR LIVING WITH A PARTNER?: NEVER MARRIED
HOW MANY DRINKS CONTAINING ALCOHOL DO YOU HAVE ON A TYPICAL DAY WHEN YOU ARE DRINKING: 1 OR 2
DO YOU BELONG TO ANY CLUBS OR ORGANIZATIONS SUCH AS CHURCH GROUPS UNIONS, FRATERNAL OR ATHLETIC GROUPS, OR SCHOOL GROUPS?: NO
IN A TYPICAL WEEK, HOW MANY TIMES DO YOU TALK ON THE PHONE WITH FAMILY, FRIENDS, OR NEIGHBORS?: MORE THAN THREE TIMES A WEEK
HOW OFTEN DO YOU ATTEND CHURCH OR RELIGIOUS SERVICES?: NEVER
ARE YOU MARRIED, WIDOWED, DIVORCED, SEPARATED, NEVER MARRIED, OR LIVING WITH A PARTNER?: NEVER MARRIED
IN A TYPICAL WEEK, HOW MANY TIMES DO YOU TALK ON THE PHONE WITH FAMILY, FRIENDS, OR NEIGHBORS?: MORE THAN THREE TIMES A WEEK
WITHIN THE PAST 12 MONTHS, YOU WORRIED THAT YOUR FOOD WOULD RUN OUT BEFORE YOU GOT THE MONEY TO BUY MORE: NEVER TRUE
IN THE PAST 12 MONTHS, HAS THE ELECTRIC, GAS, OIL, OR WATER COMPANY THREATENED TO SHUT OFF SERVICE IN YOUR HOME?: NO

## 2024-09-05 ASSESSMENT — LIFESTYLE VARIABLES
SKIP TO QUESTIONS 9-10: 1
HOW MANY STANDARD DRINKS CONTAINING ALCOHOL DO YOU HAVE ON A TYPICAL DAY: 1 OR 2
HOW OFTEN DO YOU HAVE SIX OR MORE DRINKS ON ONE OCCASION: NEVER
AUDIT-C TOTAL SCORE: 1
HOW OFTEN DO YOU HAVE SIX OR MORE DRINKS ON ONE OCCASION: NEVER
HOW OFTEN DO YOU HAVE A DRINK CONTAINING ALCOHOL: MONTHLY OR LESS
SKIP TO QUESTIONS 9-10: 1
AUDIT-C TOTAL SCORE: 1
HOW OFTEN DO YOU HAVE A DRINK CONTAINING ALCOHOL: MONTHLY OR LESS
HOW MANY STANDARD DRINKS CONTAINING ALCOHOL DO YOU HAVE ON A TYPICAL DAY: 1 OR 2

## 2024-09-10 ENCOUNTER — OFFICE VISIT (OUTPATIENT)
Dept: NEUROLOGY | Facility: MEDICAL CENTER | Age: 34
End: 2024-09-10
Attending: PSYCHIATRY & NEUROLOGY
Payer: COMMERCIAL

## 2024-09-10 VITALS
HEIGHT: 74 IN | HEART RATE: 104 BPM | OXYGEN SATURATION: 95 % | BODY MASS INDEX: 26.88 KG/M2 | DIASTOLIC BLOOD PRESSURE: 78 MMHG | SYSTOLIC BLOOD PRESSURE: 120 MMHG | TEMPERATURE: 97.7 F | WEIGHT: 209.44 LBS

## 2024-09-10 DIAGNOSIS — I77.74 VERTEBRAL ARTERY DISSECTION (HCC): ICD-10-CM

## 2024-09-10 DIAGNOSIS — I63.112: ICD-10-CM

## 2024-09-10 PROCEDURE — 99215 OFFICE O/P EST HI 40 MIN: CPT | Performed by: PSYCHIATRY & NEUROLOGY

## 2024-09-10 PROCEDURE — 99211 OFF/OP EST MAY X REQ PHY/QHP: CPT | Performed by: PSYCHIATRY & NEUROLOGY

## 2024-09-10 PROCEDURE — 3078F DIAST BP <80 MM HG: CPT | Performed by: PSYCHIATRY & NEUROLOGY

## 2024-09-10 PROCEDURE — 3074F SYST BP LT 130 MM HG: CPT | Performed by: PSYCHIATRY & NEUROLOGY

## 2024-09-10 ASSESSMENT — FIBROSIS 4 INDEX: FIB4 SCORE: 0.55

## 2024-09-10 NOTE — PROGRESS NOTES
Vascular Neurology Clinic Note  [facility]    Referring Physician: Carolyn Barnes P.A.-C.    HPI: Fernie Souza is a pleasant 34 y.o. right-handed male with no significant past medical history presenting to stroke bridge clinic for follow up.  He initially presented to hospital on August 16, 2024 with progressive bulbar weakness and sensory impairment.  He reported, he could not distinguish hot and cold when washing his hands on the left side.  Patient underwent a brain CT which did not reveal acute abnormalities.  Brain MRI revealed small left cerebellar peduncle acute infarct.  CT angiogram of the head and neck revealed diminutive left vertebral artery with distal occlusion suggestive of possible dissection.  Patient has started on Eliquis 5 mg twice a day and has been compliant.  He had a slight worsening of his symptom with unsteady gait following hospitalization, however at present time all of his symptom has resolved and he is back to baseline.    Review of systems: In addition to what is detailed in the HPI above, all other systems reviewed and are negative.    Past Medical History:    has a past medical history of Patient denies medical problems.    FHx:  family history is not on file.    SHx:   reports that he has quit smoking. His smoking use included cigarettes. He has never used smokeless tobacco. He reports current alcohol use. He reports that he does not currently use drugs.    Allergies:  No Known Allergies    Medications:    Current Outpatient Medications:     apixaban (ELIQUIS) 5mg Tab, Take 1 Tablet by mouth 2 times a day. Indications: Thromboembolism secondary to Atrial Fibrillation, Disp: 60 Tablet, Rfl: 2    Physical Examination:     NEUROLOGICAL EXAM:     Mental status: Awake, alert and fully oriented  Speech and language: Speech is clear and fluent. The patient is able to name and repeat, and follow commands  Cranial nerve exam:  Visual fields are full, extraocular  movements are intact, face is symmetric  Motor exam: There is sustained antigravity with no downward drift in bilateral arms and legs.    Sensory exam:  Intact to light touch in all 4 distal extremities, there is no neglect to double stim  Coordination: No ataxia on bilateral finger-to-nose testing  Gait: Deferred due to patient preference      NIHSS: National Institutes of Health Stroke Scale    1a. Level of Consciousness (Alert, drowsy, etc): 0= Alert    1b. LOC Questions (Month, age): 0= Answers both correctly    1c. LOC Commands (Open/close eyes make fist/let go): 0= Obeys both correctly    2.   Best Gaze (Eyes open - patient follows examiner's finger on face): 0= Normal    3.   Visual Fields (introduce visual stimulus/threat to patient's field quadrants): 0= No visual loss  4.   Facial Paresis (Show teeth, raise eyebrows and squeeze eyes shut): 0= Normal     5a. Motor Arm - Left (Elevate arm to 90 degrees if patient is sitting, 45 degrees if  supine): 0= No drift    5b. Motor Arm - Right (Elevate arm to 90 degrees if patient is sitting, 45 degrees if supine): 0= No drift    6a. Motor Leg - Left (Elevate leg 30 degrees with patient supine): 0= No drift    6b. Motor Leg - Right  (Elevate leg 30 degrees with patient supine): 0= No drift    7.   Limb Ataxia (Finger-nose, heel down shin): 0= No ataxia    8.   Sensory (Pin prick to face, arm, trunk and leg - compare side to side): 0= Normal    9.  Best Language (Name item, describe a picture and read sentences): 0= No aphasia    10. Dysarthria (Evaluate speech clarity by patient repeating listed words): 0= Normal articulation    11. Extinction and Inattention (Use information from prior testing to identify neglect or  double simultaneous stimuli testing): 0= No neglect    Total NIH Score: 0      Baseline Modified Kendall Scale (MRS): 0 = No symptoms    Objective Data:    Labs  Lab Results   Component Value Date/Time    WBC 6.5 08/15/2024 10:06 PM    RBC 5.03  "08/15/2024 10:06 PM    HEMOGLOBIN 16.0 08/15/2024 10:06 PM    HEMATOCRIT 46.0 08/15/2024 10:06 PM    PLATELETCT 232 08/15/2024 10:06 PM      Lab Results   Component Value Date/Time    PROTHROMBTM 13.2 08/15/2024 07:15 PM    INR 0.95 08/15/2024 07:15 PM     Lab Results   Component Value Date/Time    SODIUM 142 08/16/2024 03:42 AM    POTASSIUM 4.1 08/16/2024 03:42 AM    CHLORIDE 107 08/16/2024 03:42 AM    CO2 25 08/16/2024 03:42 AM    GLUCOSE 128 (H) 08/16/2024 03:42 AM    BUN 11 08/16/2024 03:42 AM    CREATININE 0.92 08/16/2024 03:42 AM      No results found for: \"CHOLSTRLTOT\", \"LDL\", \"HDL\", \"TRIGLYCERIDE\"    Lab Results   Component Value Date/Time    HBA1C 5.8 (H) 08/15/2024 10:06 PM           Imaging/Testing:  I interpreted and/or reviewed the patient's neuroimaging    MRI brain 8/16/2024:  1.  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 The recent CT angiogram demonstrated occluded left vertebral artery. There is patent right vertebral and   basilar arteries.  2.  There is tiny area of acute infarct in the left inferior cerebellar peduncle.  3.  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      CT angiogram of the head and neck 8/15/2024:  Head: Distal left vertebral artery occlusion.  Neck:  1.  Area of nonopacification of the distal left vertebral artery, compatible with occlusion.  2.  Severely hypoplastic left vertebral artery.  3.  Mediastinal and bilateral hilar adenopathy, workup and evaluation for causes of adenopathy recommended as clinically appropriate.      No orders to display         Assessment:  Fernie Souza is a pleasant 34 y.o. right-handed male with no significant past medical history presenting to stroke bridge clinic for follow up.  He initially presented to hospital on August 16, 2024 with progressive bulbar weakness and sensory " impairment.  He reported, he could not distinguish hot and cold when washing his hands on the left side.  Patient underwent a brain CT which did not reveal acute abnormalities.  Brain MRI revealed small left cerebellar peduncle acute infarct.  CT angiogram of the head and neck revealed diminutive left vertebral artery with distal occlusion suggestive of possible dissection, likely spontaneous as there was no traumatic injury or chiropractic manipulation.  Patient has started on Eliquis 5 mg twice a day and has been compliant.  He had a slight worsening of his symptom with unsteady gait following hospitalization, however at present time all of his symptom has resolved and he is back to baseline.  Brain MRI was reviewed with the patient.        TOAST classification:  [] Large artery atherosclerosis  [] Cardioembolic  [] Small vessel disease (lacunar)  [x] Other:   [] Undetermined    Problem list:  -Tiny left cerebellar peduncle acute infarct.  - Possible distal left vertebral artery dissection with occlusion      Plan:  - He will continue with Eliquis 5 mg twice a day for 6 weeks.  - Obtain CT angiogram of the head and neck in 5-6 weeks for follow-up.  - Follow-up with primary care physician.      Please note that this dictation was created using voice recognition software. I have made every reasonable attempt to correct obvious errors, but I expect that there are errors of grammar and possibly content that I did not discover before finalizing the note.       Brooklynn Macias MD  Vascular Neurology

## 2024-09-11 ENCOUNTER — TELEPHONE (OUTPATIENT)
Dept: NEUROLOGY | Facility: MEDICAL CENTER | Age: 34
End: 2024-09-11
Payer: COMMERCIAL

## 2024-09-12 ENCOUNTER — APPOINTMENT (OUTPATIENT)
Dept: MEDICAL GROUP | Facility: MEDICAL CENTER | Age: 34
End: 2024-09-12
Payer: COMMERCIAL

## 2024-09-12 ENCOUNTER — TELEPHONE (OUTPATIENT)
Dept: NEUROLOGY | Facility: MEDICAL CENTER | Age: 34
End: 2024-09-12

## 2024-09-12 NOTE — TELEPHONE ENCOUNTER
Pt. was concerned that he might double up on medication d/t short term memory loss.  Recommended getting a segmented pill dispenser to ensure proper dosing.

## 2024-09-20 SDOH — HEALTH STABILITY: PHYSICAL HEALTH: ON AVERAGE, HOW MANY MINUTES DO YOU ENGAGE IN EXERCISE AT THIS LEVEL?: 80 MIN

## 2024-09-20 SDOH — ECONOMIC STABILITY: INCOME INSECURITY: HOW HARD IS IT FOR YOU TO PAY FOR THE VERY BASICS LIKE FOOD, HOUSING, MEDICAL CARE, AND HEATING?: NOT HARD AT ALL

## 2024-09-20 SDOH — ECONOMIC STABILITY: INCOME INSECURITY: IN THE LAST 12 MONTHS, WAS THERE A TIME WHEN YOU WERE NOT ABLE TO PAY THE MORTGAGE OR RENT ON TIME?: NO

## 2024-09-20 SDOH — ECONOMIC STABILITY: FOOD INSECURITY: WITHIN THE PAST 12 MONTHS, YOU WORRIED THAT YOUR FOOD WOULD RUN OUT BEFORE YOU GOT MONEY TO BUY MORE.: NEVER TRUE

## 2024-09-20 SDOH — ECONOMIC STABILITY: FOOD INSECURITY: WITHIN THE PAST 12 MONTHS, THE FOOD YOU BOUGHT JUST DIDN'T LAST AND YOU DIDN'T HAVE MONEY TO GET MORE.: NEVER TRUE

## 2024-09-20 SDOH — HEALTH STABILITY: PHYSICAL HEALTH: ON AVERAGE, HOW MANY DAYS PER WEEK DO YOU ENGAGE IN MODERATE TO STRENUOUS EXERCISE (LIKE A BRISK WALK)?: 5 DAYS

## 2024-09-20 ASSESSMENT — SOCIAL DETERMINANTS OF HEALTH (SDOH)
IN THE PAST 12 MONTHS, HAS THE ELECTRIC, GAS, OIL, OR WATER COMPANY THREATENED TO SHUT OFF SERVICE IN YOUR HOME?: NO
HOW OFTEN DO YOU HAVE SIX OR MORE DRINKS ON ONE OCCASION: NEVER
HOW OFTEN DO YOU ATTEND CHURCH OR RELIGIOUS SERVICES?: NEVER
HOW OFTEN DO YOU GET TOGETHER WITH FRIENDS OR RELATIVES?: MORE THAN THREE TIMES A WEEK
HOW HARD IS IT FOR YOU TO PAY FOR THE VERY BASICS LIKE FOOD, HOUSING, MEDICAL CARE, AND HEATING?: NOT HARD AT ALL
WITHIN THE PAST 12 MONTHS, YOU WORRIED THAT YOUR FOOD WOULD RUN OUT BEFORE YOU GOT THE MONEY TO BUY MORE: NEVER TRUE
HOW OFTEN DO YOU GET TOGETHER WITH FRIENDS OR RELATIVES?: MORE THAN THREE TIMES A WEEK
DO YOU BELONG TO ANY CLUBS OR ORGANIZATIONS SUCH AS CHURCH GROUPS UNIONS, FRATERNAL OR ATHLETIC GROUPS, OR SCHOOL GROUPS?: NO
ARE YOU MARRIED, WIDOWED, DIVORCED, SEPARATED, NEVER MARRIED, OR LIVING WITH A PARTNER?: NEVER MARRIED
IN A TYPICAL WEEK, HOW MANY TIMES DO YOU TALK ON THE PHONE WITH FAMILY, FRIENDS, OR NEIGHBORS?: MORE THAN THREE TIMES A WEEK
HOW MANY DRINKS CONTAINING ALCOHOL DO YOU HAVE ON A TYPICAL DAY WHEN YOU ARE DRINKING: 1 OR 2
IN A TYPICAL WEEK, HOW MANY TIMES DO YOU TALK ON THE PHONE WITH FAMILY, FRIENDS, OR NEIGHBORS?: MORE THAN THREE TIMES A WEEK
HOW OFTEN DO YOU HAVE A DRINK CONTAINING ALCOHOL: MONTHLY OR LESS
HOW OFTEN DO YOU ATTEND CHURCH OR RELIGIOUS SERVICES?: NEVER
DO YOU BELONG TO ANY CLUBS OR ORGANIZATIONS SUCH AS CHURCH GROUPS UNIONS, FRATERNAL OR ATHLETIC GROUPS, OR SCHOOL GROUPS?: NO
HOW OFTEN DO YOU ATTENT MEETINGS OF THE CLUB OR ORGANIZATION YOU BELONG TO?: NEVER
ARE YOU MARRIED, WIDOWED, DIVORCED, SEPARATED, NEVER MARRIED, OR LIVING WITH A PARTNER?: NEVER MARRIED
HOW OFTEN DO YOU ATTENT MEETINGS OF THE CLUB OR ORGANIZATION YOU BELONG TO?: NEVER

## 2024-09-20 ASSESSMENT — LIFESTYLE VARIABLES
HOW MANY STANDARD DRINKS CONTAINING ALCOHOL DO YOU HAVE ON A TYPICAL DAY: 1 OR 2
AUDIT-C TOTAL SCORE: 1
HOW OFTEN DO YOU HAVE SIX OR MORE DRINKS ON ONE OCCASION: NEVER
HOW OFTEN DO YOU HAVE A DRINK CONTAINING ALCOHOL: MONTHLY OR LESS
SKIP TO QUESTIONS 9-10: 1

## 2024-09-23 ENCOUNTER — OFFICE VISIT (OUTPATIENT)
Dept: MEDICAL GROUP | Facility: MEDICAL CENTER | Age: 34
End: 2024-09-23
Payer: COMMERCIAL

## 2024-09-23 VITALS
WEIGHT: 209.4 LBS | BODY MASS INDEX: 26.87 KG/M2 | SYSTOLIC BLOOD PRESSURE: 120 MMHG | TEMPERATURE: 98.1 F | OXYGEN SATURATION: 97 % | HEART RATE: 97 BPM | HEIGHT: 74 IN | DIASTOLIC BLOOD PRESSURE: 78 MMHG

## 2024-09-23 DIAGNOSIS — R59.0 LYMPHADENOPATHY, ABDOMINAL: ICD-10-CM

## 2024-09-23 DIAGNOSIS — R73.01 IFG (IMPAIRED FASTING GLUCOSE): ICD-10-CM

## 2024-09-23 DIAGNOSIS — R59.0 MEDIASTINAL LYMPHADENOPATHY: ICD-10-CM

## 2024-09-23 DIAGNOSIS — I65.02: ICD-10-CM

## 2024-09-23 PROBLEM — I63.212: Status: ACTIVE | Noted: 2024-09-23

## 2024-09-23 PROCEDURE — 3074F SYST BP LT 130 MM HG: CPT | Performed by: NURSE PRACTITIONER

## 2024-09-23 PROCEDURE — 3078F DIAST BP <80 MM HG: CPT | Performed by: NURSE PRACTITIONER

## 2024-09-23 PROCEDURE — 99214 OFFICE O/P EST MOD 30 MIN: CPT | Performed by: NURSE PRACTITIONER

## 2024-09-23 ASSESSMENT — FIBROSIS 4 INDEX: FIB4 SCORE: 0.55

## 2024-09-23 NOTE — PROGRESS NOTES
Subjective:     History of Present Illness  The patient is a 34-year-old male seen in follow-up after hospitalization for a stroke.    He is seeking to establish primary care as he currently does not have a primary care physician.     He was recently admitted to hospital for a left vertebral artery CVA.  His initial visit to the emergency room was due to difficulty swallowing liquids, which he later discovered was a symptom of his stroke. During his hospital stay, his condition worsened to the point where he was unable to walk unaided. However, he has since regained mobility and is able to run and lift weights.  A swallowing study was done for the dysphagia and was wnl.    He reports continued numbness in his right arm and occasional twitching on left upper cheek. He was informed by a neurologist that he has a thin artery at the left vertebral artery that the CVA was in. He has been prescribed blood thinners and is currently under the care of a neurologist. He was advised to avoid activities that could strain his neck.  He was instructed to take blood thinners for six months but only received a three-month supply. He took his last dose on the previous Friday and is now out of medication. He is seeking alternatives to Eliquis due to insurance coverage issues.    While in hospital one of his tests showed lymphadenopathy in medistinal and hilar areas tahira.  Further w/u was recommended.  He denies any sx of infection.  No fever, chills or swelling.      He has lost weight through regular exercise, including running, jogging, hiking, and gym workouts. He has also quit smoking.     A1c in hospital was 5.7.  this is new dx.  He is not on med for DM.  His mother and grandmother have DM.      FAMILY HISTORY  He has a family history of diabetes.    Results  Laboratory Studies  Hemoglobin A1c is 5.8.    Imaging  MRI of brain w & w/o:  8/16/24   1.  Nonvisualization of the flow void of intracranial left vertebral artery  consistent left vertebral occlusion likely secondary to dissection. The recent CT angiogram demonstrated occluded left vertebral artery. There is patent right vertebral and   basilar arteries.  2.  There is tiny area of acute infarct in the left inferior cerebellar peduncle.  3.  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    CT-CTA neck:  8/15/24  1.  Area of nonopacification of the distal left vertebral artery, compatible with occlusion.  2.  Severely hypoplastic left vertebral artery.  3.  Mediastinal and bilateral hilar adenopathy, workup and evaluation for causes of adenopathy recommended as clinically appropriate.    CT-CTA HEAD 8/15/24  1.  Distal left vertebral artery occlusion.       Current medicines (including changes today)  Current Outpatient Medications   Medication Sig Dispense Refill    apixaban (ELIQUIS) 5mg Tab Take 1 Tablet by mouth 2 times a day. Indications: Thromboembolism secondary to Atrial Fibrillation 60 Tablet 2     No current facility-administered medications for this visit.     He  has a past medical history of Alcohol use disorder (08/16/2024), Bulbar weakness (HCC) (08/15/2024), Cereb infrc due to unsp occls or stenosis of left verteb art (HCC) (09/23/2024), IFG (impaired fasting glucose) (09/23/2024), and Tobacco use disorder (08/16/2024).    Hemoglobin A1c:  Lab Results   Component Value Date/Time    HBA1C 5.8 (H) 08/15/2024 10:06 PM       Thyroid:  Lab Results   Component Value Date/Time    TSHULTRASEN 1.020 08/15/2024 1915     Lab Results   Component Value Date/Time    FREET4 1.39 08/15/2024 1915       Complete Blood Count:  Lab Results   Component Value Date/Time    WBC 6.5 08/15/2024 2206    RBC 5.03 08/15/2024 2206    HEMOGLOBIN 16.0 08/15/2024 2206    HEMATOCRIT 46.0 08/15/2024 2206    MCV 91.5 08/15/2024 2206    MCH 31.8 08/15/2024 2206    MCHC 34.8 08/15/2024 2206    RDW 44.2 08/15/2024 2206    MPV 10.1  08/15/2024 2206    LYMPHOCYTES 20.60 (L) 08/15/2024 2206    LYMPHS 1.33 08/15/2024 2206    MONOCYTES 6.50 08/15/2024 2206    MONOS 0.42 08/15/2024 2206    EOSINOPHILS 0.90 08/15/2024 2206    EOS 0.06 08/15/2024 2206    BASOPHILS 0.30 08/15/2024 2206    BASO 0.02 08/15/2024 2206    NRBC 0.00 08/15/2024 2206       Complete Metabolic Panel:  Lab Results   Component Value Date/Time    SODIUM 142 08/16/2024 03:42 AM    POTASSIUM 4.1 08/16/2024 03:42 AM    CHLORIDE 107 08/16/2024 03:42 AM    CO2 25 08/16/2024 03:42 AM    ANION 10.0 08/16/2024 03:42 AM    GLUCOSE 128 (H) 08/16/2024 03:42 AM    BUN 11 08/16/2024 03:42 AM    CREATININE 0.92 08/16/2024 03:42 AM    ASTSGOT 11 (L) 08/16/2024 03:42 AM    ALTSGPT 9 08/16/2024 03:42 AM    TBILIRUBIN 0.4 08/16/2024 03:42 AM    ALBUMIN 4.1 08/16/2024 03:42 AM    TOTPROTEIN 6.4 08/16/2024 03:42 AM    GLOBULIN 2.3 08/16/2024 03:42 AM    AGRATIO 1.8 08/16/2024 03:42 AM         GFR:    Lab Results   Component Value Date/Time    GFRCKD 111 08/16/2024 0342           ROS   Review of Systems   Constitutional: Negative.  Negative for fever, chills, weight loss, malaise/fatigue and diaphoresis.   HENT: Negative.  Negative for hearing loss, ear pain, nosebleeds, congestion, sore throat, neck pain, tinnitus and ear discharge.    Respiratory: Negative.  Negative for cough, hemoptysis, sputum production, shortness of breath, wheezing and stridor.    Cardiovascular: Negative.  Negative for chest pain, palpitations, orthopnea, claudication, leg swelling and PND.   Gastrointestinal: denies nausea, vomiting, diarrhea, constipation, heartburn, melena or hematochezia.  Genitourinary: Denies dysuria, hematuria, urinary incontinence, frequency or urgency.    Musculoskeletal: Negative.  Negative for myalgias and back pain.   Neurological: Negative.  Negative for dizziness, tingling, tremors, weakness and headaches.   Psych:  Denies depression, anxiety or insomnia.  All other systems reviewed and are  "negative.         Objective:     /78 (BP Location: Left arm, Patient Position: Sitting, BP Cuff Size: Adult)   Pulse 97   Temp 36.7 °C (98.1 °F) (Temporal)   Ht 1.88 m (6' 2\")   Wt 95 kg (209 lb 6.4 oz)   SpO2 97%  Body mass index is 26.89 kg/m².   Physical Exam    Physical Exam   Vitals reviewed.  Constitutional: oriented to person, place, and time. appears well-developed and well-nourished. No distress.   Neck: No JVD present.  Cardiovascular: Normal rate, regular rhythm, normal heart sounds and intact distal pulses.  Exam reveals no gallop and no friction rub.  No murmur heard.  No carotid bruits.   Pulmonary/Chest: Effort normal and breath sounds normal. No stridor. No respiratory distress. no wheezes or rales. exhibits no tenderness.   Musculoskeletal: Normal range of motion. exhibits no edema. tahira pedal pulses 2+.  Lymphadenopathy: no cervical or supraclavicular adenopathy.   Neurological: alert and oriented to person, place, and time. exhibits normal muscle tone. Coordination normal.   Skin: Skin is warm and dry. no diaphoresis.   Psychiatric: normal mood and affect. behavior is normal.           Assessment and Plan:   The following treatment plan was discussed      Assessment & Plan  1. Left vertebral artery cerebrovascular accident.  The stroke was confirmed to be due to a distal left vertebral artery occlusion, not atrial fibrillation. He reports residual symptoms including inability to feel temperature on his right arm and a twitch. He is advised to abstain from Advil, Aleve, ibuprofen, Voltaren, diclofenac, and meloxicam due to their potential cardiovascular risks. A referral to Cardiology will be made to assess for any additional plaque buildup. He is encouraged to reduce carbohydrate intake to manage his cholesterol levels. A lipofit test will be performed to evaluate cholesterol particle size and type. He is advised to consult with his insurance provider regarding the coverage of Coumadin, " Xarelto, and Eliquis.  He is not able to afford eliquis and his insurance will no cover meds.  He will discuss with his insurance what they will pay for.  He will let me know and i will check with neurology if approp med chg.     2. Prediabetes.  His A1c is 5.8, indicating prediabetes. He is advised to maintain a healthy diet and regular exercise regimen to prevent progression to diabetes. An albumin creatinine ratio test will be conducted to check for any kidney damage. His A1c levels should be monitored every 6 months.    3. Mediastinal lymphadenopathy.  Enlarged lymph nodes were noted, which could indicate infection, cancer, or be normal for him.   A CT scan of the chest, abdomen, and pelvis will be ordered to investigate enlarged lymph nodes.     4. Medication Management.  He reports running out of blood thinners and is advised to check with his insurance provider for coverage options for Coumadin, Xarelto, and Eliquis. Alternatives such as Coumadin (warfarin) and Xarelto (rivaroxaban) were discussed. He is advised to use GoodRx to potentially lower the cost of Eliquis.    Follow-up  Return in 2 to 3 weeks after completing the lab tests for test review and discussion of anticoagulation med chg.      ORDERS:  1. Embolic occlusion of left vertebral artery    - LipoFit by NMR; Future    2. IFG (impaired fasting glucose)    - LipoFit by NMR; Future  - MICROALBUMIN CREAT RATIO URINE; Future    3. Lymphadenopathy, abdominal      4. Mediastinal lymphadenopathy    - CT-CHEST (THORAX) W/O; Future  - CT-ABDOMEN-PELVIS WITH; Future          Please note that this dictation was created using voice recognition software. I have made every reasonable attempt to correct obvious errors, but I expect that there are errors of grammar and possibly content that I did not discover before finalizing the note.      Attestation      Verbal consent was acquired by the patient to use [employer] ambient listening note generation during  this visit Yes

## 2024-10-01 ENCOUNTER — APPOINTMENT (OUTPATIENT)
Dept: RADIOLOGY | Facility: MEDICAL CENTER | Age: 34
End: 2024-10-01
Attending: NURSE PRACTITIONER
Payer: COMMERCIAL

## 2024-10-01 DIAGNOSIS — R59.0 MEDIASTINAL LYMPHADENOPATHY: ICD-10-CM

## 2024-10-01 PROCEDURE — 71250 CT THORAX DX C-: CPT

## 2024-10-17 ENCOUNTER — TELEPHONE (OUTPATIENT)
Dept: HEALTH INFORMATION MANAGEMENT | Facility: OTHER | Age: 34
End: 2024-10-17
Payer: COMMERCIAL

## 2025-01-13 ENCOUNTER — OFFICE VISIT (OUTPATIENT)
Dept: URGENT CARE | Facility: CLINIC | Age: 35
End: 2025-01-13
Payer: COMMERCIAL

## 2025-01-13 ENCOUNTER — HOSPITAL ENCOUNTER (EMERGENCY)
Facility: MEDICAL CENTER | Age: 35
End: 2025-01-13
Attending: EMERGENCY MEDICINE
Payer: COMMERCIAL

## 2025-01-13 ENCOUNTER — APPOINTMENT (OUTPATIENT)
Dept: RADIOLOGY | Facility: MEDICAL CENTER | Age: 35
End: 2025-01-13
Attending: EMERGENCY MEDICINE
Payer: COMMERCIAL

## 2025-01-13 VITALS
WEIGHT: 227.51 LBS | RESPIRATION RATE: 18 BRPM | OXYGEN SATURATION: 95 % | TEMPERATURE: 98.2 F | DIASTOLIC BLOOD PRESSURE: 98 MMHG | SYSTOLIC BLOOD PRESSURE: 157 MMHG | BODY MASS INDEX: 29.2 KG/M2 | HEART RATE: 88 BPM | HEIGHT: 74 IN

## 2025-01-13 VITALS
TEMPERATURE: 98.4 F | SYSTOLIC BLOOD PRESSURE: 112 MMHG | HEIGHT: 74 IN | DIASTOLIC BLOOD PRESSURE: 76 MMHG | OXYGEN SATURATION: 98 % | HEART RATE: 93 BPM | BODY MASS INDEX: 29.13 KG/M2 | WEIGHT: 227 LBS | RESPIRATION RATE: 7 BRPM

## 2025-01-13 DIAGNOSIS — R20.2 PARESTHESIA: ICD-10-CM

## 2025-01-13 DIAGNOSIS — R20.0 NUMBNESS: ICD-10-CM

## 2025-01-13 DIAGNOSIS — I65.02 OCCLUSION OF LEFT VERTEBRAL ARTERY: ICD-10-CM

## 2025-01-13 DIAGNOSIS — M25.512 ACUTE PAIN OF LEFT SHOULDER: ICD-10-CM

## 2025-01-13 DIAGNOSIS — I63.212: ICD-10-CM

## 2025-01-13 DIAGNOSIS — M54.2 NECK PAIN: ICD-10-CM

## 2025-01-13 LAB
ALBUMIN SERPL BCP-MCNC: 4.7 G/DL (ref 3.2–4.9)
ALBUMIN/GLOB SERPL: 1.6 G/DL
ALP SERPL-CCNC: 60 U/L (ref 30–99)
ALT SERPL-CCNC: 24 U/L (ref 2–50)
ANION GAP SERPL CALC-SCNC: 12 MMOL/L (ref 7–16)
AST SERPL-CCNC: 20 U/L (ref 12–45)
BASOPHILS # BLD AUTO: 0.6 % (ref 0–1.8)
BASOPHILS # BLD: 0.03 K/UL (ref 0–0.12)
BILIRUB SERPL-MCNC: 0.6 MG/DL (ref 0.1–1.5)
BUN SERPL-MCNC: 13 MG/DL (ref 8–22)
CALCIUM ALBUM COR SERPL-MCNC: 9 MG/DL (ref 8.5–10.5)
CALCIUM SERPL-MCNC: 9.6 MG/DL (ref 8.4–10.2)
CHLORIDE SERPL-SCNC: 101 MMOL/L (ref 96–112)
CO2 SERPL-SCNC: 24 MMOL/L (ref 20–33)
CREAT SERPL-MCNC: 0.83 MG/DL (ref 0.5–1.4)
EKG IMPRESSION: NORMAL
EOSINOPHIL # BLD AUTO: 0.06 K/UL (ref 0–0.51)
EOSINOPHIL NFR BLD: 1.2 % (ref 0–6.9)
ERYTHROCYTE [DISTWIDTH] IN BLOOD BY AUTOMATED COUNT: 39.5 FL (ref 35.9–50)
GFR SERPLBLD CREATININE-BSD FMLA CKD-EPI: 117 ML/MIN/1.73 M 2
GLOBULIN SER CALC-MCNC: 2.9 G/DL (ref 1.9–3.5)
GLUCOSE SERPL-MCNC: 173 MG/DL (ref 65–99)
HCT VFR BLD AUTO: 52.3 % (ref 42–52)
HGB BLD-MCNC: 18.1 G/DL (ref 14–18)
IMM GRANULOCYTES # BLD AUTO: 0.04 K/UL (ref 0–0.11)
IMM GRANULOCYTES NFR BLD AUTO: 0.8 % (ref 0–0.9)
LYMPHOCYTES # BLD AUTO: 1.11 K/UL (ref 1–4.8)
LYMPHOCYTES NFR BLD: 21.5 % (ref 22–41)
MCH RBC QN AUTO: 31.5 PG (ref 27–33)
MCHC RBC AUTO-ENTMCNC: 34.6 G/DL (ref 32.3–36.5)
MCV RBC AUTO: 91.1 FL (ref 81.4–97.8)
MONOCYTES # BLD AUTO: 0.3 K/UL (ref 0–0.85)
MONOCYTES NFR BLD AUTO: 5.8 % (ref 0–13.4)
NEUTROPHILS # BLD AUTO: 3.62 K/UL (ref 1.82–7.42)
NEUTROPHILS NFR BLD: 70.1 % (ref 44–72)
NRBC # BLD AUTO: 0 K/UL
NRBC BLD-RTO: 0 /100 WBC (ref 0–0.2)
PLATELET # BLD AUTO: 215 K/UL (ref 164–446)
PMV BLD AUTO: 9.6 FL (ref 9–12.9)
POTASSIUM SERPL-SCNC: 4.3 MMOL/L (ref 3.6–5.5)
PROT SERPL-MCNC: 7.6 G/DL (ref 6–8.2)
RBC # BLD AUTO: 5.74 M/UL (ref 4.7–6.1)
SODIUM SERPL-SCNC: 137 MMOL/L (ref 135–145)
WBC # BLD AUTO: 5.2 K/UL (ref 4.8–10.8)

## 2025-01-13 PROCEDURE — 3074F SYST BP LT 130 MM HG: CPT | Performed by: PHYSICIAN ASSISTANT

## 2025-01-13 PROCEDURE — 99215 OFFICE O/P EST HI 40 MIN: CPT | Performed by: PHYSICIAN ASSISTANT

## 2025-01-13 PROCEDURE — 36415 COLL VENOUS BLD VENIPUNCTURE: CPT

## 2025-01-13 PROCEDURE — 93005 ELECTROCARDIOGRAM TRACING: CPT | Mod: TC | Performed by: EMERGENCY MEDICINE

## 2025-01-13 PROCEDURE — 700102 HCHG RX REV CODE 250 W/ 637 OVERRIDE(OP): Performed by: EMERGENCY MEDICINE

## 2025-01-13 PROCEDURE — 700111 HCHG RX REV CODE 636 W/ 250 OVERRIDE (IP): Mod: JZ | Performed by: EMERGENCY MEDICINE

## 2025-01-13 PROCEDURE — 700117 HCHG RX CONTRAST REV CODE 255: Performed by: EMERGENCY MEDICINE

## 2025-01-13 PROCEDURE — 85025 COMPLETE CBC W/AUTO DIFF WBC: CPT

## 2025-01-13 PROCEDURE — 700101 HCHG RX REV CODE 250: Performed by: EMERGENCY MEDICINE

## 2025-01-13 PROCEDURE — 70496 CT ANGIOGRAPHY HEAD: CPT

## 2025-01-13 PROCEDURE — 80053 COMPREHEN METABOLIC PANEL: CPT

## 2025-01-13 PROCEDURE — 70498 CT ANGIOGRAPHY NECK: CPT

## 2025-01-13 PROCEDURE — 99284 EMERGENCY DEPT VISIT MOD MDM: CPT

## 2025-01-13 PROCEDURE — 3078F DIAST BP <80 MM HG: CPT | Performed by: PHYSICIAN ASSISTANT

## 2025-01-13 PROCEDURE — 96374 THER/PROPH/DIAG INJ IV PUSH: CPT

## 2025-01-13 PROCEDURE — A9270 NON-COVERED ITEM OR SERVICE: HCPCS | Performed by: EMERGENCY MEDICINE

## 2025-01-13 RX ORDER — NAPROXEN 500 MG/1
500 TABLET ORAL 2 TIMES DAILY WITH MEALS
Qty: 60 TABLET | Refills: 0 | Status: SHIPPED | OUTPATIENT
Start: 2025-01-13

## 2025-01-13 RX ORDER — LIDOCAINE 4 G/G
1 PATCH TOPICAL EVERY 24 HOURS
Status: DISCONTINUED | OUTPATIENT
Start: 2025-01-13 | End: 2025-01-13 | Stop reason: HOSPADM

## 2025-01-13 RX ORDER — CYCLOBENZAPRINE HCL 10 MG
10 TABLET ORAL 3 TIMES DAILY PRN
Qty: 30 TABLET | Refills: 0 | Status: SHIPPED | OUTPATIENT
Start: 2025-01-13

## 2025-01-13 RX ORDER — KETOROLAC TROMETHAMINE 15 MG/ML
15 INJECTION, SOLUTION INTRAMUSCULAR; INTRAVENOUS ONCE
Status: COMPLETED | OUTPATIENT
Start: 2025-01-13 | End: 2025-01-13

## 2025-01-13 RX ORDER — CYCLOBENZAPRINE HCL 10 MG
10 TABLET ORAL ONCE
Status: COMPLETED | OUTPATIENT
Start: 2025-01-13 | End: 2025-01-13

## 2025-01-13 RX ORDER — LIDOCAINE 50 MG/G
1 PATCH TOPICAL EVERY 24 HOURS
Qty: 10 PATCH | Refills: 0 | Status: SHIPPED | OUTPATIENT
Start: 2025-01-13

## 2025-01-13 RX ADMIN — CYCLOBENZAPRINE 10 MG: 10 TABLET, FILM COATED ORAL at 13:44

## 2025-01-13 RX ADMIN — KETOROLAC TROMETHAMINE 15 MG: 15 INJECTION, SOLUTION INTRAMUSCULAR; INTRAVENOUS at 13:44

## 2025-01-13 RX ADMIN — IOHEXOL 80 ML: 350 INJECTION, SOLUTION INTRAVENOUS at 14:23

## 2025-01-13 RX ADMIN — LIDOCAINE 1 PATCH: 4 PATCH TOPICAL at 13:44

## 2025-01-13 ASSESSMENT — ENCOUNTER SYMPTOMS
SENSORY CHANGE: 1
HEADACHES: 0
VOMITING: 0
FEVER: 0
DOUBLE VISION: 0
NAUSEA: 0
BLURRED VISION: 0
DIZZINESS: 0
MYALGIAS: 1
CHILLS: 0

## 2025-01-13 ASSESSMENT — FIBROSIS 4 INDEX
FIB4 SCORE: 0.55
FIB4 SCORE: 0.55

## 2025-01-13 NOTE — DISCHARGE SUMMARY
"Okay  ED Observation Discharge Summary    Patient:Fernie Souza  Patient : 1990  Patient MRN: [id number]  Patient PCP: ABGIAIL Cagle    Admit Date: 2025  Discharge Date and Time: 25 3:21 PM    Discharge Diagnosis: Neck pain, chronic vertebral artery occlusion  Discharge Attending: Billy Esquivel M.D.  Discharge Service: ED Observation    ED Course  Fernie is a 34 y.o. male who was evaluated at [facility] for a stiff neck.  He was seen by Dr. Valera, and is currently pending CTA as he has had prior vertebral artery occlusion.  His CT did show chronic occlusion, case was discussed with Dr. Helton from neurology.  At this point given the timing of his occlusion several months ago he does not need Eliquis but should be on daily aspirin lifelong.  This was communicated to patient he will be discharged to follow-up with his primary care    Discharge Exam:  BP (!) 157/98   Pulse 88   Temp 36.8 °C (98.2 °F) (Temporal)   Resp 18   Ht 1.88 m (6' 2\")   Wt 103 kg (227 lb 8.2 oz)   SpO2 95%   BMI 29.21 kg/m² .    Constitutional: Awake and alert. Nontoxic  HENT:  Grossly normal  Eyes: Grossly normal  Neck: Normal range of motion  Cardiovascular: Normal heart rate   Thorax & Lungs: No respiratory distress  Skin:  No pathologic rash.   Extremities: Well perfused  Psychiatric: Affect normal    Labs  Results for orders placed or performed during the hospital encounter of 25   CBC WITH DIFFERENTIAL    Collection Time: 25 12:34 PM   Result Value Ref Range    WBC 5.2 4.8 - 10.8 K/uL    RBC 5.74 4.70 - 6.10 M/uL    Hemoglobin 18.1 (H) 14.0 - 18.0 g/dL    Hematocrit 52.3 (H) 42.0 - 52.0 %    MCV 91.1 81.4 - 97.8 fL    MCH 31.5 27.0 - 33.0 pg    MCHC 34.6 32.3 - 36.5 g/dL    RDW 39.5 35.9 - 50.0 fL    Platelet Count 215 164 - 446 K/uL    MPV 9.6 9.0 - 12.9 fL    Neutrophils-Polys 70.10 44.00 - 72.00 %    Lymphocytes 21.50 (L) 22.00 - 41.00 %    Monocytes 5.80 0.00 - 13.40 %    " Eosinophils 1.20 0.00 - 6.90 %    Basophils 0.60 0.00 - 1.80 %    Immature Granulocytes 0.80 0.00 - 0.90 %    Nucleated RBC 0.00 0.00 - 0.20 /100 WBC    Neutrophils (Absolute) 3.62 1.82 - 7.42 K/uL    Lymphs (Absolute) 1.11 1.00 - 4.80 K/uL    Monos (Absolute) 0.30 0.00 - 0.85 K/uL    Eos (Absolute) 0.06 0.00 - 0.51 K/uL    Baso (Absolute) 0.03 0.00 - 0.12 K/uL    Immature Granulocytes (abs) 0.04 0.00 - 0.11 K/uL    NRBC (Absolute) 0.00 K/uL   Comp Metabolic Panel    Collection Time: 25 12:34 PM   Result Value Ref Range    Sodium 137 135 - 145 mmol/L    Potassium 4.3 3.6 - 5.5 mmol/L    Chloride 101 96 - 112 mmol/L    Co2 24 20 - 33 mmol/L    Anion Gap 12.0 7.0 - 16.0    Glucose 173 (H) 65 - 99 mg/dL    Bun 13 8 - 22 mg/dL    Creatinine 0.83 0.50 - 1.40 mg/dL    Calcium 9.6 8.4 - 10.2 mg/dL    Correct Calcium 9.0 8.5 - 10.5 mg/dL    AST(SGOT) 20 12 - 45 U/L    ALT(SGPT) 24 2 - 50 U/L    Alkaline Phosphatase 60 30 - 99 U/L    Total Bilirubin 0.6 0.1 - 1.5 mg/dL    Albumin 4.7 3.2 - 4.9 g/dL    Total Protein 7.6 6.0 - 8.2 g/dL    Globulin 2.9 1.9 - 3.5 g/dL    A-G Ratio 1.6 g/dL   ESTIMATED GFR    Collection Time: 25 12:34 PM   Result Value Ref Range    GFR (CKD-EPI) 117 >60 mL/min/1.73 m 2   EKG    Collection Time: 25  2:10 PM   Result Value Ref Range    Report       [facility] Emergency Dept.    Test Date:  2025  Pt Name:    SIL GUPTA                   Department: [facility]  MRN:        [id number]                      Room:       -ROOM 9  Gender:     Male                         Technician: [id number]  :        1990                   Requested By:CICI VALERA  Order #:    [id number]                    Reading MD: Cici Valera MD    Measurements  Intervals                                Axis  Rate:       85                           P:          41  MT:         145                          QRS:        -7  QRSD:       100                          T:          42  QT:          367  QTc:        437    Interpretive Statements  Sinus rhythm  ST elev, probable normal early repol pattern  Compared to ECG 08/15/2024 18:14:10  ST (T wave) deviation now present  Electronically Signed On 01- 14:10:17 PST by Cici Valera MD         Radiology  CT-CTA NECK WITH & W/O-POST PROCESSING   Final Result         1. Normal-appearing carotid arteries and right vertebral artery.   2. Marked attenuation of the left vertebral artery with chronic distal occlusion.      CT-CTA HEAD WITH & W/O-POST PROCESS   Final Result         1. Chronic occlusion of the left vertebral artery.   2. Right maxillary sinus polyp or mucous retention cyst.          Medications:   New Prescriptions    CYCLOBENZAPRINE (FLEXERIL) 10 MG TAB    Take 1 Tablet by mouth 3 times a day as needed for Mild Pain, Moderate Pain or Muscle Spasms.    LIDOCAINE (LIDODERM) 5 % PATCH    Place 1 Patch on the skin every 24 hours.    NAPROXEN (NAPROSYN) 500 MG TAB    Take 1 Tablet by mouth 2 times a day with meals.       My final assessment includes neck pain, chronic vertebral artery occlusion  Upon Reevaluation, the patient's condition has: Improved; and will be discharged.    Patient discharged from ED Observation status at 3:22 PM   (Time) 01/13/25   (Date).     Total time spent on this ED Observation discharge encounter is > 30 Minutes    Electronically signed by: Billy Esquivel M.D., 1/13/2025 2:06 PM

## 2025-01-13 NOTE — ED PROVIDER NOTES
ED Provider Note      Primary care provider: ABIGAIL Cagle  Means of arrival: private vehicle  History obtained from: patient   History limited by: none    CHIEF COMPLAINT  Chief Complaint   Patient presents with    Stiff Neck     Left neck numbness with recent DX of vertebral artery occulusion int hat area. Patient came in to ensure this was not related to the numbness.        HPI/ROS  Fernie Souza is a 34 y.o. male who presents to the Emergency Department for evaluation of neck pain.  Patient reports a couple days ago he noted pinching sensation to his left posterior neck.  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  However he started having paresthesias down his left arm over the last day and therefore came in for further evaluation.  He believes it is likely musculoskeletal however has a history of vertebral artery occlusion in August 2024 and is concerned that this may have recurred.  He does that he was on Eliquis for 1 month after the initial episode and due to insurance reasons did not take the full course of 6 months.  He currently denies numbness or weakness.  His pain is localized to the left posterior neck.  Denies any trauma.    EXTERNAL RECORDS REVIEWED  Patient previously hospitalized on 8/15/2024 for right upper extremity paresthesias.  CTA at that time showed left vertebral artery occlusion.  He was started on aspirin and statin.  MRI showed left peduncle infarct.  Plan was to transition to 6-month Eliquis regiment and he was subsequently discharged.    LIMITATION TO HISTORY   None      PAST MEDICAL HISTORY   has a past medical history of Alcohol use disorder (08/16/2024), Bulbar weakness (HCC) (08/15/2024), Cereb infrc due to unsp occls or stenosis of left verteb art (HCC) (09/23/2024), IFG (impaired fasting glucose) (09/23/2024), and Tobacco use disorder (08/16/2024).    SURGICAL HISTORY  patient denies any surgical  "history    SOCIAL HISTORY  Social History     Tobacco Use    Smoking status: Former     Types: Cigarettes    Smokeless tobacco: Never   Vaping Use    Vaping status: Former    Substances: pt reports stopping vaping approx 2 wks prior, currently using nicotine gum   Substance Use Topics    Alcohol use: Yes     Comment: social/weekends    Drug use: Not Currently      Social History     Substance and Sexual Activity   Drug Use Not Currently       FAMILY HISTORY  History reviewed. No pertinent family history.    CURRENT MEDICATIONS  Home Medications       Reviewed by Jeffrey Scanlon R.N. (Registered Nurse) on 01/13/25 at 1150  Med List Status: Not Addressed     Medication Last Dose Status   apixaban (ELIQUIS) 5mg Tab  Active                  Audit from Redirected Encounters    **Home medications have not yet been reviewed for this encounter**         ALLERGIES  No Known Allergies    PHYSICAL EXAM  VITAL SIGNS: BP (!) 157/98   Pulse 88   Temp 36.8 °C (98.2 °F) (Temporal)   Resp 18   Ht 1.88 m (6' 2\")   Wt 103 kg (227 lb 8.2 oz)   SpO2 95%   BMI 29.21 kg/m²   Vitals reviewed by myself.  Physical Exam  Nursing note and vitals reviewed.  Constitutional: Well-developed and well-nourished. No acute distress.   HENT: Head is normocephalic and atraumatic.  Eyes: extra-ocular movements intact  Neck: Patient has tenderness to palpation of the left cervical paraspinal muscles, pain with range of motion of the neck  Cardiovascular: regular rate and  regular rhythm. No murmur heard.  Pulmonary/Chest: Breath sounds normal. No wheezes or rales.   Musculoskeletal: Extremities exhibit normal range of motion without edema or tenderness.  5 out of 5  strength in bilateral upper extremities.  Normal range of motion of bilateral upper extremities  Neurological: Awake and alert, sensation intact in bilateral upper extremities  Skin: Skin is warm and dry. No rash.         DIAGNOSTIC STUDIES:  LABS  Labs Reviewed   CBC WITH " DIFFERENTIAL - Abnormal; Notable for the following components:       Result Value    Hemoglobin 18.1 (*)     Hematocrit 52.3 (*)     Lymphocytes 21.50 (*)     All other components within normal limits   COMP METABOLIC PANEL - Abnormal; Notable for the following components:    Glucose 173 (*)     All other components within normal limits   ESTIMATED GFR       All labs reviewed and independently interpreted by myself    EKG Interpretation:    12 Lead EKG independently interpreted by myself to show:  EKG at 12:52 AM: Normal sinus rhythm, heart rate 85, normal axis, normal intervals, , , QTc 437, no acute ST-T segment changes, benign repolarization pattern is present, no evidence of acute arrhythmia or ischemia per my independent potation    RADIOLOGY    Final interpretation by radiology demonstrates:    CT-CTA HEAD WITH & W/O-POST PROCESS    (Results Pending)   CT-CTA NECK WITH & W/O-POST PROCESSING    (Results Pending)     The radiologist's interpretation of all radiological studies have been reviewed by me.        COURSE & MEDICAL DECISION MAKING    ED OBS: Yes; I am placing the patient in to an observation status due to a diagnostic uncertainty as well as therapeutic intensity. Patient placed in observation status at 12:31 PM, 1/13/2025.     Observation plan is as follows: Follow-up imaging    INITIAL ASSESSMENT, ED COURSE AND PLAN    Patient is a 34-year-old male who comes in for evaluation of neck pain and left arm paresthesias.  Differential diagnosis includes radiculopathy, muscle strain, muscle spasm, vertebral artery occlusion, arrhythmia.  Diagnostic workup includes chest x-ray, labs and CTA of the head and neck.    Patient's initial vitals notable for slight hypertension likely related to pain.  Patient is treated with Flexeril, Toradol and lidocaine patch.  EKG returns and demonstrates no evidence of acute arrhythmia or ischemia.  Labs returned and are unremarkable.  At this time CT of the head  and neck are pending.  If they are negative patient can likely be discharged, I have provided with prescriptions for Flexeril, naproxen and lidocaine patches.  If positive they will need to be addressed accordingly.  Patient signed out to oncoming physician to follow-up imaging.    DISPOSITION AND DISCUSSIONS  I have discussed management of the patient with the following physicians and YARITZA's: Dr. Esquivel    Discussion of management with other Q or appropriate source(s): none     Escalation of care considered, and ultimately not performed:see above    Barriers to care at this time, including but not limited to: none.     Decision tools and prescription drugs considered including, but not limited to: see above.        FINAL IMPRESSION  1. Neck pain    2. Paresthesia

## 2025-01-13 NOTE — ED TRIAGE NOTES
"Patient presents to the ER with the following complaints:    Chief Complaint   Patient presents with    Stiff Neck     Left neck numbness with recent DX of vertebral artery occulusion int hat area. Patient came in to ensure this was not related to the numbness.        BP (!) 157/98   Pulse 88   Temp 36.8 °C (98.2 °F) (Temporal)   Resp 18   Ht 1.88 m (6' 2\")   Wt 103 kg (227 lb 8.2 oz)   SpO2 95%   BMI 29.21 kg/m²       "

## 2025-01-13 NOTE — PROGRESS NOTES
Subjective:   Fernie Souza is a 34 y.o. male who presents for Back Strain (Left shoulder blade strain x started on Friday. Negative for stroke sx./Works from computer all day, denies injury)        Patient presents with concerns of left shoulder pain that began Friday as well as numbness on the left side of his neck that began yesterday.  Symptoms are not worsening but they are also not improving.  Shoulder pain is sharp.  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  He was initially on Eliquis for 30 days, but has not taken this medication since September, as insurance was not covering the medication.  States that he has right arm numbness and lack of temperature sensitivity at baseline-no acute changes in this.  No sensory, motor changes, or weakness in other extremities.  He denies headache and vision changes.  He is not taking aspirin.  He last saw neurology in September.  He is concerned about a recurrence of vertebral artery occlusion.      Review of Systems   Constitutional:  Negative for chills and fever.   Eyes:  Negative for blurred vision and double vision.   Gastrointestinal:  Negative for nausea and vomiting.   Musculoskeletal:  Positive for myalgias.   Neurological:  Positive for sensory change. Negative for dizziness and headaches.       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  MEDS:   Current Outpatient Medications:     apixaban (ELIQUIS) 5mg Tab, Take 1 Tablet by mouth 2 times a day. Indications: Thromboembolism secondary to Atrial Fibrillation (Patient not taking: Reported on 1/13/2025), Disp: 60 Tablet, Rfl: 2  ALLERGIES: No Known Allergies  SURGHX: History reviewed. No pertinent surgical history.  SOCHX:  reports  "that he has quit smoking. His smoking use included cigarettes. He has never used smokeless tobacco. He reports current alcohol use. He reports that he does not currently use drugs.  FH: Family history was reviewed, no pertinent findings to report   Objective:   /76 (BP Location: Left arm, Patient Position: Sitting, BP Cuff Size: Adult)   Pulse 93   Temp 36.9 °C (98.4 °F) (Temporal)   Resp (!) 7   Ht 1.88 m (6' 2\")   Wt 103 kg (227 lb)   SpO2 98%   BMI 29.15 kg/m²   Physical Exam  Vitals reviewed.   Constitutional:       General: He is not in acute distress.     Appearance: Normal appearance. He is well-developed. He is not toxic-appearing.   HENT:      Head: Normocephalic and atraumatic.      Right Ear: External ear normal.      Left Ear: External ear normal.      Nose: Nose normal.   Cardiovascular:      Comments: Regular rate and rhythm without murmurs rubs or gallops.  Pulmonary:      Effort: Pulmonary effort is normal. No respiratory distress.      Breath sounds: No stridor.   Skin:     General: Skin is dry.   Neurological:      GCS: GCS eye subscore is 4. GCS verbal subscore is 5. GCS motor subscore is 6.      Comments: ANO x 4.  Cranial nerves II through XII intact.  Sensation on neck appears grossly intact and even bilaterally.  No extremity weakness.  Gait normal.   Psychiatric:         Speech: Speech normal.         Behavior: Behavior normal.           Assessment/Plan:   1. Numbness    2. Acute pain of left shoulder    3. Cereb infrc due to unsp occls or stenosis of left verteb art ([facility])    Recommend transfer to the emergency department for reevaluation and further management.  Patient will go directly to [facility] ED for reevaluation.  Transfer center contacted to inform of patient disposition and impending arrival.  "

## 2025-01-13 NOTE — DISCHARGE INSTRUCTIONS
Your CT scan shows a chronic occlusion in your vertebral artery.  You do not need to be on Eliquis or the blood thinner for this as this has been there for several months however you do need to take an 81 mg aspirin daily.